# Patient Record
Sex: MALE | Race: WHITE | HISPANIC OR LATINO | Employment: FULL TIME | URBAN - METROPOLITAN AREA
[De-identification: names, ages, dates, MRNs, and addresses within clinical notes are randomized per-mention and may not be internally consistent; named-entity substitution may affect disease eponyms.]

---

## 2018-08-01 ENCOUNTER — OFFICE VISIT (OUTPATIENT)
Dept: FAMILY MEDICINE | Facility: CLINIC | Age: 38
End: 2018-08-01
Payer: COMMERCIAL

## 2018-08-01 ENCOUNTER — LAB VISIT (OUTPATIENT)
Dept: LAB | Facility: HOSPITAL | Age: 38
End: 2018-08-01
Attending: FAMILY MEDICINE
Payer: COMMERCIAL

## 2018-08-01 VITALS
WEIGHT: 213.19 LBS | RESPIRATION RATE: 20 BRPM | SYSTOLIC BLOOD PRESSURE: 112 MMHG | TEMPERATURE: 99 F | HEIGHT: 71 IN | OXYGEN SATURATION: 99 % | HEART RATE: 69 BPM | BODY MASS INDEX: 29.85 KG/M2 | DIASTOLIC BLOOD PRESSURE: 92 MMHG

## 2018-08-01 DIAGNOSIS — Z00.00 ANNUAL PHYSICAL EXAM: Primary | ICD-10-CM

## 2018-08-01 DIAGNOSIS — Z00.00 ANNUAL PHYSICAL EXAM: ICD-10-CM

## 2018-08-01 LAB
ALBUMIN SERPL BCP-MCNC: 4.7 G/DL
ALP SERPL-CCNC: 46 U/L
ALT SERPL W/O P-5'-P-CCNC: 17 U/L
ANION GAP SERPL CALC-SCNC: 10 MMOL/L
AST SERPL-CCNC: 18 U/L
BASOPHILS # BLD AUTO: 0.01 K/UL
BASOPHILS NFR BLD: 0.2 %
BILIRUB SERPL-MCNC: 1.6 MG/DL
BUN SERPL-MCNC: 11 MG/DL
CALCIUM SERPL-MCNC: 10 MG/DL
CHLORIDE SERPL-SCNC: 100 MMOL/L
CHOLEST SERPL-MCNC: 178 MG/DL
CHOLEST/HDLC SERPL: 3.2 {RATIO}
CO2 SERPL-SCNC: 26 MMOL/L
CREAT SERPL-MCNC: 1 MG/DL
DIFFERENTIAL METHOD: ABNORMAL
EOSINOPHIL # BLD AUTO: 0 K/UL
EOSINOPHIL NFR BLD: 0.5 %
ERYTHROCYTE [DISTWIDTH] IN BLOOD BY AUTOMATED COUNT: 13.2 %
EST. GFR  (AFRICAN AMERICAN): >60 ML/MIN/1.73 M^2
EST. GFR  (NON AFRICAN AMERICAN): >60 ML/MIN/1.73 M^2
ESTIMATED AVG GLUCOSE: 94 MG/DL
GLUCOSE SERPL-MCNC: 95 MG/DL
HBA1C MFR BLD HPLC: 4.9 %
HCT VFR BLD AUTO: 43.7 %
HDLC SERPL-MCNC: 56 MG/DL
HDLC SERPL: 31.5 %
HGB BLD-MCNC: 14.7 G/DL
IMM GRANULOCYTES # BLD AUTO: 0.04 K/UL
IMM GRANULOCYTES NFR BLD AUTO: 0.7 %
LDLC SERPL CALC-MCNC: 111.6 MG/DL
LYMPHOCYTES # BLD AUTO: 1.4 K/UL
LYMPHOCYTES NFR BLD: 22.3 %
MCH RBC QN AUTO: 29.3 PG
MCHC RBC AUTO-ENTMCNC: 33.6 G/DL
MCV RBC AUTO: 87 FL
MONOCYTES # BLD AUTO: 0.5 K/UL
MONOCYTES NFR BLD: 8.8 %
NEUTROPHILS # BLD AUTO: 4.2 K/UL
NEUTROPHILS NFR BLD: 67.5 %
NONHDLC SERPL-MCNC: 122 MG/DL
NRBC BLD-RTO: 0 /100 WBC
PLATELET # BLD AUTO: 208 K/UL
PMV BLD AUTO: 10.2 FL
POTASSIUM SERPL-SCNC: 4 MMOL/L
PROT SERPL-MCNC: 7.8 G/DL
RBC # BLD AUTO: 5.01 M/UL
SODIUM SERPL-SCNC: 136 MMOL/L
T4 FREE SERPL-MCNC: 1.13 NG/DL
TRIGL SERPL-MCNC: 52 MG/DL
TSH SERPL DL<=0.005 MIU/L-ACNC: 1.01 UIU/ML
WBC # BLD AUTO: 6.15 K/UL

## 2018-08-01 PROCEDURE — 84443 ASSAY THYROID STIM HORMONE: CPT

## 2018-08-01 PROCEDURE — 99999 PR PBB SHADOW E&M-EST. PATIENT-LVL III: CPT | Mod: PBBFAC,,, | Performed by: FAMILY MEDICINE

## 2018-08-01 PROCEDURE — 83036 HEMOGLOBIN GLYCOSYLATED A1C: CPT

## 2018-08-01 PROCEDURE — 99385 PREV VISIT NEW AGE 18-39: CPT | Mod: S$GLB,,, | Performed by: FAMILY MEDICINE

## 2018-08-01 PROCEDURE — 85025 COMPLETE CBC W/AUTO DIFF WBC: CPT

## 2018-08-01 PROCEDURE — 36415 COLL VENOUS BLD VENIPUNCTURE: CPT | Mod: PO

## 2018-08-01 PROCEDURE — 80053 COMPREHEN METABOLIC PANEL: CPT

## 2018-08-01 PROCEDURE — 84439 ASSAY OF FREE THYROXINE: CPT

## 2018-08-01 PROCEDURE — 80061 LIPID PANEL: CPT

## 2018-08-01 RX ORDER — TETANUS TOXOID, REDUCED DIPHTHERIA TOXOID AND ACELLULAR PERTUSSIS VACCINE, ADSORBED 5; 2.5; 8; 8; 2.5 [IU]/.5ML; [IU]/.5ML; UG/.5ML; UG/.5ML; UG/.5ML
SUSPENSION INTRAMUSCULAR
COMMUNITY
Start: 2018-07-31 | End: 2019-10-10

## 2018-08-01 NOTE — PROGRESS NOTES
Chief Complaint   Patient presents with    [facility]       HPI  Farhan Munoz is a 38 y.o. male with medical diagnoses as listed in the medical history and problem list that presents to [facility].      Increased stressors - owns 2 companies, financial stress, baby due this month, last 2-3 months, increased anxiety overall.     Treatment - has stopped checking emails late night, later start to day,     PAST MEDICAL HISTORY:  No past medical history on file.    PAST SURGICAL HISTORY:  Past Surgical History:   Procedure Laterality Date    KNEE SURGERY      16yrs       SOCIAL HISTORY:  Social History     Social History    Marital status:      Spouse name: N/A    Number of children: N/A    Years of education: N/A     Occupational History    Not on file.     Social History Main Topics    Smoking status: Never Smoker    Smokeless tobacco: Not on file    Alcohol use Yes    Drug use: No    Sexual activity: Yes     Other Topics Concern    Not on file     Social History Narrative    No narrative on file       FAMILY HISTORY:  Family History   Problem Relation Age of Onset    Pancreatic cancer Father        ALLERGIES AND MEDICATIONS: updated and reviewed.  Review of patient's allergies indicates:  No Known Allergies  Current Outpatient Prescriptions   Medication Sig Dispense Refill    BOOSTRIX TDAP 2.5-8-5 Lf-mcg-Lf/0.5mL Syrg injection       finasteride (PROSCAR) 5 mg tablet        No current facility-administered medications for this visit.        ROS  Review of Systems   Constitutional: Negative for activity change and unexpected weight change.   HENT: Negative for hearing loss, rhinorrhea and trouble swallowing.    Eyes: Negative for discharge and visual disturbance.   Respiratory: Negative for chest tightness and wheezing.    Cardiovascular: Negative for chest pain and palpitations.   Gastrointestinal: Negative for blood in stool, constipation, diarrhea and vomiting.   Endocrine:  "Negative for polydipsia and polyuria.   Genitourinary: Negative for difficulty urinating, hematuria and urgency.   Musculoskeletal: Negative for arthralgias, joint swelling and neck pain.   Neurological: Negative for weakness and headaches.   Psychiatric/Behavioral: Positive for dysphoric mood. Negative for confusion.       Physical Exam  Vitals:    08/01/18 0958   BP: (!) 112/92   Pulse: 69   Resp: 20   Temp: 98.6 °F (37 °C)    Body mass index is 29.73 kg/m².  Weight: 96.7 kg (213 lb 3 oz)   Height: 5' 11" (180.3 cm)     Physical Exam   Constitutional: He is oriented to person, place, and time. He appears well-developed and well-nourished.   HENT:   Head: Normocephalic and atraumatic.   Eyes: Conjunctivae and EOM are normal. Pupils are equal, round, and reactive to light.   Neck: Normal range of motion. Neck supple.   Cardiovascular: Normal rate, regular rhythm and normal heart sounds.    Pulmonary/Chest: Effort normal and breath sounds normal.   Abdominal: Soft. Bowel sounds are normal.   Musculoskeletal: Normal range of motion.   Neurological: He is alert and oriented to person, place, and time. He has normal reflexes.   Skin: Skin is warm and dry.   Psychiatric: He has a normal mood and affect. His behavior is normal. Judgment and thought content normal.       Health Maintenance       Date Due Completion Date    Lipid Panel 1981 ---    TETANUS VACCINE 05/05/1999 ---    Influenza Vaccine 08/01/2018 ---          Assessment & Plan    Annual physical exam  -     CBC auto differential; Future; Expected date: 08/01/2018  -     Comprehensive metabolic panel; Future; Expected date: 08/01/2018  -     Lipid panel; Future; Expected date: 08/01/2018  -     TSH; Future; Expected date: 08/01/2018  -     T4, free; Future; Expected date: 08/01/2018  - Counseled on age appropriate medical preventative services, including age appropriate cancer screenings, over all nutritional health, need for a consistent exercise regimen " and an over all push towards maintaining a vigorous and active lifestyle.      - Counseled on age appropriate vaccines and discussed upcoming health care needs based on age/gender.  Spent time with patient counseling on need for a good patient/doctor relationship moving forward.  Discussed use of common OTC medications and supplements.  Discussed common dietary aids and use of caffeine and the need for good sleep hygiene and stress management.        Follow-up in about 3 months (around 11/1/2018), or if symptoms worsen or fail to improve.

## 2019-10-11 ENCOUNTER — OFFICE VISIT (OUTPATIENT)
Dept: FAMILY MEDICINE | Facility: CLINIC | Age: 39
End: 2019-10-11
Payer: COMMERCIAL

## 2019-10-11 VITALS
WEIGHT: 211.63 LBS | SYSTOLIC BLOOD PRESSURE: 130 MMHG | OXYGEN SATURATION: 100 % | HEIGHT: 71 IN | TEMPERATURE: 97 F | DIASTOLIC BLOOD PRESSURE: 80 MMHG | HEART RATE: 63 BPM | BODY MASS INDEX: 29.63 KG/M2

## 2019-10-11 DIAGNOSIS — Z00.00 ANNUAL PHYSICAL EXAM: Primary | ICD-10-CM

## 2019-10-11 DIAGNOSIS — E66.3 OVERWEIGHT (BMI 25.0-29.9): ICD-10-CM

## 2019-10-11 PROCEDURE — 99395 PREV VISIT EST AGE 18-39: CPT | Mod: S$GLB,,, | Performed by: FAMILY MEDICINE

## 2019-10-11 PROCEDURE — 99999 PR PBB SHADOW E&M-EST. PATIENT-LVL III: CPT | Mod: PBBFAC,,, | Performed by: FAMILY MEDICINE

## 2019-10-11 PROCEDURE — 99395 PR PREVENTIVE VISIT,EST,18-39: ICD-10-PCS | Mod: S$GLB,,, | Performed by: FAMILY MEDICINE

## 2019-10-11 PROCEDURE — 99999 PR PBB SHADOW E&M-EST. PATIENT-LVL III: ICD-10-PCS | Mod: PBBFAC,,, | Performed by: FAMILY MEDICINE

## 2019-10-11 NOTE — PROGRESS NOTES
"Chief Complaint   Patient presents with    [facility]     SUBJECTIVE:   Farhan Munoz is a 39 y.o. male presenting for his annual checkup.  No current outpatient medications on file.     No current facility-administered medications for this visit.      Allergies: Patient has no known allergies.     ROS:  Feeling well. No dyspnea or chest pain on exertion. No abdominal pain, change in bowel habits, black or bloody stools. No urinary tract or prostatic symptoms. No neurological complaints.  Answers for HPI/ROS submitted by the patient on 10/10/2019   activity change: No  unexpected weight change: No  neck pain: No  hearing loss: No  rhinorrhea: No  trouble swallowing: No  eye discharge: No  visual disturbance: No  chest tightness: No  wheezing: No  chest pain: No  palpitations: No  blood in stool: No  constipation: No  vomiting: No  diarrhea: No  polydipsia: No  polyuria: No  difficulty urinating: No  urgency: No  hematuria: No  joint swelling: No  arthralgias: No  headaches: No  weakness: No  confusion: No  dysphoric mood: No    OBJECTIVE:   The patient appears well, alert, oriented x 3, in no distress.   /80   Pulse 63   Temp 97.4 °F (36.3 °C) (Oral)   Ht 5' 11" (1.803 m)   Wt 96 kg (211 lb 10.3 oz)   SpO2 100%   BMI 29.52 kg/m²        ENT normal.  Neck supple. No adenopathy or thyromegaly. ESTELLA. Lungs are clear, good air entry, no wheezes, rhonchi or rales. S1 and S2 normal, no murmurs, regular rate and rhythm. Abdomen is soft without tenderness, guarding, mass or organomegaly.  exam: deferred.  Extremities show no edema, normal peripheral pulses. Neurological is normal without focal findings.  Some signs of chronic dehydration.    ASSESSMENT:   1. Annual physical exam    2. Overweight (BMI 25.0-29.9)        PLAN:   Farhan was seen today for [facility].    Diagnoses and all orders for this visit:    Annual physical exam    Overweight (BMI 25.0-29.9)    Other orders  -     Cancel: Tdap " Vaccine      Counseled on age appropriate medical preventative services, including age appropriate cancer screenings, over all nutritional health, need for a consistent exercise regimen and an over all push towards maintaining a vigorous and active lifestyle.  Counseled on age appropriate vaccines and discussed upcoming health care needs based on age/gender.  Spent time with patient counseling on need for a good patient/doctor relationship moving forward.  Discussed use of common OTC medications and supplements.  Discussed common dietary aids and use of caffeine and the need for good sleep hygiene and stress management.

## 2019-11-25 ENCOUNTER — LAB VISIT (OUTPATIENT)
Dept: LAB | Facility: HOSPITAL | Age: 39
End: 2019-11-25
Attending: FAMILY MEDICINE
Payer: COMMERCIAL

## 2019-11-25 DIAGNOSIS — Z00.00 ANNUAL PHYSICAL EXAM: ICD-10-CM

## 2019-11-25 LAB
ALBUMIN SERPL BCP-MCNC: 4.6 G/DL (ref 3.5–5.2)
ALP SERPL-CCNC: 49 U/L (ref 55–135)
ALT SERPL W/O P-5'-P-CCNC: 15 U/L (ref 10–44)
ANION GAP SERPL CALC-SCNC: 7 MMOL/L (ref 8–16)
AST SERPL-CCNC: 16 U/L (ref 10–40)
BASOPHILS # BLD AUTO: 0.01 K/UL (ref 0–0.2)
BASOPHILS NFR BLD: 0.2 % (ref 0–1.9)
BILIRUB SERPL-MCNC: 1.2 MG/DL (ref 0.1–1)
BUN SERPL-MCNC: 15 MG/DL (ref 6–20)
CALCIUM SERPL-MCNC: 10.2 MG/DL (ref 8.7–10.5)
CHLORIDE SERPL-SCNC: 103 MMOL/L (ref 95–110)
CHOLEST SERPL-MCNC: 181 MG/DL (ref 120–199)
CHOLEST/HDLC SERPL: 3 {RATIO} (ref 2–5)
CO2 SERPL-SCNC: 30 MMOL/L (ref 23–29)
CREAT SERPL-MCNC: 1.1 MG/DL (ref 0.5–1.4)
DIFFERENTIAL METHOD: ABNORMAL
EOSINOPHIL # BLD AUTO: 0 K/UL (ref 0–0.5)
EOSINOPHIL NFR BLD: 0.9 % (ref 0–8)
ERYTHROCYTE [DISTWIDTH] IN BLOOD BY AUTOMATED COUNT: 13.6 % (ref 11.5–14.5)
EST. GFR  (AFRICAN AMERICAN): >60 ML/MIN/1.73 M^2
EST. GFR  (NON AFRICAN AMERICAN): >60 ML/MIN/1.73 M^2
ESTIMATED AVG GLUCOSE: 100 MG/DL (ref 68–131)
GLUCOSE SERPL-MCNC: 109 MG/DL (ref 70–110)
HBA1C MFR BLD HPLC: 5.1 % (ref 4–5.6)
HCT VFR BLD AUTO: 45 % (ref 40–54)
HDLC SERPL-MCNC: 60 MG/DL (ref 40–75)
HDLC SERPL: 33.1 % (ref 20–50)
HGB BLD-MCNC: 14.9 G/DL (ref 14–18)
IMM GRANULOCYTES # BLD AUTO: 0.03 K/UL (ref 0–0.04)
IMM GRANULOCYTES NFR BLD AUTO: 0.6 % (ref 0–0.5)
LDLC SERPL CALC-MCNC: 105.8 MG/DL (ref 63–159)
LYMPHOCYTES # BLD AUTO: 1.5 K/UL (ref 1–4.8)
LYMPHOCYTES NFR BLD: 31.8 % (ref 18–48)
MCH RBC QN AUTO: 28.3 PG (ref 27–31)
MCHC RBC AUTO-ENTMCNC: 33.1 G/DL (ref 32–36)
MCV RBC AUTO: 86 FL (ref 82–98)
MONOCYTES # BLD AUTO: 0.4 K/UL (ref 0.3–1)
MONOCYTES NFR BLD: 7.6 % (ref 4–15)
NEUTROPHILS # BLD AUTO: 2.7 K/UL (ref 1.8–7.7)
NEUTROPHILS NFR BLD: 58.9 % (ref 38–73)
NONHDLC SERPL-MCNC: 121 MG/DL
NRBC BLD-RTO: 0 /100 WBC
PLATELET # BLD AUTO: 171 K/UL (ref 150–350)
PMV BLD AUTO: 10 FL (ref 9.2–12.9)
POTASSIUM SERPL-SCNC: 4.1 MMOL/L (ref 3.5–5.1)
PROT SERPL-MCNC: 7.8 G/DL (ref 6–8.4)
RBC # BLD AUTO: 5.26 M/UL (ref 4.6–6.2)
SODIUM SERPL-SCNC: 140 MMOL/L (ref 136–145)
TRIGL SERPL-MCNC: 76 MG/DL (ref 30–150)
TSH SERPL DL<=0.005 MIU/L-ACNC: 1.59 UIU/ML (ref 0.4–4)
URATE SERPL-MCNC: 5.8 MG/DL (ref 3.4–7)
WBC # BLD AUTO: 4.62 K/UL (ref 3.9–12.7)

## 2019-11-25 PROCEDURE — 83036 HEMOGLOBIN GLYCOSYLATED A1C: CPT

## 2019-11-25 PROCEDURE — 85025 COMPLETE CBC W/AUTO DIFF WBC: CPT

## 2019-11-25 PROCEDURE — 84550 ASSAY OF BLOOD/URIC ACID: CPT

## 2019-11-25 PROCEDURE — 80061 LIPID PANEL: CPT

## 2019-11-25 PROCEDURE — 84443 ASSAY THYROID STIM HORMONE: CPT

## 2019-11-25 PROCEDURE — 80053 COMPREHEN METABOLIC PANEL: CPT

## 2019-11-25 PROCEDURE — 82040 ASSAY OF SERUM ALBUMIN: CPT

## 2019-11-30 LAB
ALBUMIN SERPL-MCNC: 4.8 G/DL (ref 3.6–5.1)
SHBG SERPL-SCNC: 52 NMOL/L (ref 10–50)
TESTOST FREE SERPL-MCNC: 62.9 PG/ML (ref 46–224)
TESTOST SERPL-MCNC: 675 NG/DL (ref 250–1100)
TESTOSTERONE.FREE+WB SERPL-MCNC: 137.6 NG/DL (ref 110–575)

## 2019-12-12 ENCOUNTER — OFFICE VISIT (OUTPATIENT)
Dept: FAMILY MEDICINE | Facility: CLINIC | Age: 39
End: 2019-12-12
Payer: COMMERCIAL

## 2019-12-12 VITALS
TEMPERATURE: 98 F | OXYGEN SATURATION: 99 % | HEIGHT: 71 IN | DIASTOLIC BLOOD PRESSURE: 74 MMHG | BODY MASS INDEX: 30.77 KG/M2 | SYSTOLIC BLOOD PRESSURE: 120 MMHG | WEIGHT: 219.81 LBS | HEART RATE: 74 BPM

## 2019-12-12 DIAGNOSIS — Z00.00 ANNUAL PHYSICAL EXAM: ICD-10-CM

## 2019-12-12 DIAGNOSIS — R73.03 PREDIABETES: Primary | ICD-10-CM

## 2019-12-12 PROCEDURE — 99999 PR PBB SHADOW E&M-EST. PATIENT-LVL III: ICD-10-PCS | Mod: PBBFAC,,, | Performed by: FAMILY MEDICINE

## 2019-12-12 PROCEDURE — 99999 PR PBB SHADOW E&M-EST. PATIENT-LVL III: CPT | Mod: PBBFAC,,, | Performed by: FAMILY MEDICINE

## 2019-12-12 PROCEDURE — 99215 PR OFFICE/OUTPT VISIT, EST, LEVL V, 40-54 MIN: ICD-10-PCS | Mod: S$GLB,,, | Performed by: FAMILY MEDICINE

## 2019-12-12 PROCEDURE — 99215 OFFICE O/P EST HI 40 MIN: CPT | Mod: S$GLB,,, | Performed by: FAMILY MEDICINE

## 2019-12-12 NOTE — PROGRESS NOTES
"Chief Complaint   Patient presents with    Results       SUBJECTIVE:  Farhan Munoz is a 39 y.o. male here for new problem of results and review it.  Currently has co-morbidities including per problem list.      History reviewed. No pertinent past medical history.  Past Surgical History:   Procedure Laterality Date    KNEE SURGERY      16yrs     Social History     Socioeconomic History    Marital status:      Spouse name: Not on file    Number of children: Not on file    Years of education: Not on file    Highest education level: Not on file   Occupational History    Not on file   Social Needs    Financial resource strain: Not hard at all    Food insecurity:     Worry: Never true     Inability: Never true    Transportation needs:     Medical: No     Non-medical: No   Tobacco Use    Smoking status: Never Smoker   Substance and Sexual Activity    Alcohol use: Yes     Frequency: 2-3 times a week     Drinks per session: 1 or 2     Binge frequency: Never    Drug use: No    Sexual activity: Yes   Lifestyle    Physical activity:     Days per week: 0 days     Minutes per session: 0 min    Stress: To some extent   Relationships    Social connections:     Talks on phone: More than three times a week     Gets together: Once a week     Attends [employer] service: Not on file     Active member of club or organization: No     Attends meetings of clubs or organizations: Never     Relationship status:    Other Topics Concern    Not on file   Social History Narrative    Not on file     Family History   Problem Relation Age of Onset    Pancreatic cancer Father      No current outpatient medications on file prior to visit.     No current facility-administered medications on file prior to visit.      Review of patient's allergies indicates:  No Known Allergies      ROS    OBJECTIVE:  /74   Pulse 74   Temp 97.9 °F (36.6 °C) (Oral)   Ht 5' 11" (1.803 m)   Wt 99.7 kg (219 lb 12.8 oz)   SpO2 99%   " BMI 30.66 kg/m²     Wt Readings from Last 3 Encounters:   12/12/19 99.7 kg (219 lb 12.8 oz)   10/11/19 96 kg (211 lb 10.3 oz)   08/01/18 96.7 kg (213 lb 3 oz)     BP Readings from Last 3 Encounters:   12/12/19 120/74   10/11/19 130/80   08/01/18 (!) 112/92   Answers for HPI/ROS submitted by the patient on 12/11/2019   activity change: No  unexpected weight change: No  rhinorrhea: No  trouble swallowing: No  visual disturbance: No  chest tightness: No  polyuria: No  difficulty urinating: No  joint swelling: No  arthralgias: No  confusion: No  dysphoric mood: No      He appears well, in no apparent distress.  Alert and oriented times three, pleasant and cooperative. Vital signs are as documented in vital signs section.  Time spent in counseling    Review of old Records:  Reviewed per epic     Review of old labs:  Lab Visit on 11/25/2019   Component Date Value Ref Range Status    Specimen UA 11/25/2019 Urine, Unspecified   Final    Color, UA 11/25/2019 Straw  Yellow, Straw, Radha Final    Appearance, UA 11/25/2019 Clear  Clear Final    pH, UA 11/25/2019 7.0  5.0 - 8.0 Final    Specific [place], UA 11/25/2019 1.005  1.005 - 1.030 Final    Protein, UA 11/25/2019 Negative  Negative Final    Comment: Recommend a 24 hour urine protein or a urine   protein/creatinine ratio if globulin induced proteinuria is  clinically suspected.      Glucose, UA 11/25/2019 Negative  Negative Final    Ketones, UA 11/25/2019 Negative  Negative Final    Bilirubin (UA) 11/25/2019 Negative  Negative Final    Occult Blood UA 11/25/2019 Negative  Negative Final    Nitrite, UA 11/25/2019 Negative  Negative Final    Urobilinogen, UA 11/25/2019 Negative  <2.0 EU/dL Final    Leukocytes, UA 11/25/2019 Negative  Negative Final   Lab Visit on 11/25/2019   Component Date Value Ref Range Status    WBC 11/25/2019 4.62  3.90 - 12.70 K/uL Final    RBC 11/25/2019 5.26  4.60 - 6.20 M/uL Final    Hemoglobin 11/25/2019 14.9  14.0 - 18.0 g/dL Final     Hematocrit 11/25/2019 45.0  40.0 - 54.0 % Final    Mean Corpuscular Volume 11/25/2019 86  82 - 98 fL Final    Mean Corpuscular Hemoglobin 11/25/2019 28.3  27.0 - 31.0 pg Final    Mean Corpuscular Hemoglobin Conc 11/25/2019 33.1  32.0 - 36.0 g/dL Final    RDW 11/25/2019 13.6  11.5 - 14.5 % Final    Platelets 11/25/2019 171  150 - 350 K/uL Final    MPV 11/25/2019 10.0  9.2 - 12.9 fL Final    Immature Granulocytes 11/25/2019 0.6* 0.0 - 0.5 % Final    Gran # (ANC) 11/25/2019 2.7  1.8 - 7.7 K/uL Final    Immature Grans (Abs) 11/25/2019 0.03  0.00 - 0.04 K/uL Final    Comment: Mild elevation in immature granulocytes is non specific and   can be seen in a variety of conditions including stress response,   acute inflammation, trauma and pregnancy. Correlation with other   laboratory and clinical findings is essential.      Lymph # 11/25/2019 1.5  1.0 - 4.8 K/uL Final    Mono # 11/25/2019 0.4  0.3 - 1.0 K/uL Final    Eos # 11/25/2019 0.0  0.0 - 0.5 K/uL Final    Baso # 11/25/2019 0.01  0.00 - 0.20 K/uL Final    nRBC 11/25/2019 0  0 /100 WBC Final    Gran% 11/25/2019 58.9  38.0 - 73.0 % Final    Lymph% 11/25/2019 31.8  18.0 - 48.0 % Final    Mono% 11/25/2019 7.6  4.0 - 15.0 % Final    Eosinophil% 11/25/2019 0.9  0.0 - 8.0 % Final    Basophil% 11/25/2019 0.2  0.0 - 1.9 % Final    Differential Method 11/25/2019 Automated   Final    Sodium 11/25/2019 140  136 - 145 mmol/L Final    Potassium 11/25/2019 4.1  3.5 - 5.1 mmol/L Final    Chloride 11/25/2019 103  95 - 110 mmol/L Final    CO2 11/25/2019 30* 23 - 29 mmol/L Final    Glucose 11/25/2019 109  70 - 110 mg/dL Final    BUN, Bld 11/25/2019 15  6 - 20 mg/dL Final    Creatinine 11/25/2019 1.1  0.5 - 1.4 mg/dL Final    Calcium 11/25/2019 10.2  8.7 - 10.5 mg/dL Final    Total Protein 11/25/2019 7.8  6.0 - 8.4 g/dL Final    Albumin 11/25/2019 4.6  3.5 - 5.2 g/dL Final    Total Bilirubin 11/25/2019 1.2* 0.1 - 1.0 mg/dL Final    Comment: For infants  and newborns, interpretation of results should be based  on gestational age, weight and in agreement with clinical  observations.  Premature Infant recommended reference ranges:  Up to 24 hours.............<8.0 mg/dL  Up to 48 hours............<12.0 mg/dL  3-5 days..................<15.0 mg/dL  6-29 days.................<15.0 mg/dL      Alkaline Phosphatase 11/25/2019 49* 55 - 135 U/L Final    AST 11/25/2019 16  10 - 40 U/L Final    ALT 11/25/2019 15  10 - 44 U/L Final    Anion Gap 11/25/2019 7* 8 - 16 mmol/L Final    eGFR if African American 11/25/2019 >60  >60 mL/min/1.73 m^2 Final    eGFR if non African American 11/25/2019 >60  >60 mL/min/1.73 m^2 Final    Comment: Calculation used to obtain the estimated glomerular filtration  rate (eGFR) is the CKD-EPI equation.       Hemoglobin A1C 11/25/2019 5.1  4.0 - 5.6 % Final    Comment: ADA Screening Guidelines:  5.7-6.4%  Consistent with prediabetes  >or=6.5%  Consistent with diabetes  High levels of fetal hemoglobin interfere with the HbA1C  assay. Heterozygous hemoglobin variants (HbS, HgC, etc)do  not significantly interfere with this assay.   However, presence of multiple variants may affect accuracy.      Estimated Avg Glucose 11/25/2019 100  68 - 131 mg/dL Final    Cholesterol 11/25/2019 181  120 - 199 mg/dL Final    Comment: The National Cholesterol Education Program (NCEP) has set the  following guidelines (reference ranges) for Cholesterol:  Optimal.....................<200 mg/dL  Borderline High.............200-239 mg/dL  High........................> or = 240 mg/dL      Triglycerides 11/25/2019 76  30 - 150 mg/dL Final    Comment: The National Cholesterol Education Program (NCEP) has set the  following guidelines (reference values) for triglycerides:  Normal......................<150 mg/dL  Borderline High.............150-199 mg/dL  High........................200-499 mg/dL      HDL 11/25/2019 60  40 - 75 mg/dL Final    Comment: The National  Cholesterol Education Program (NCEP) has set the  following guidelines (reference values) for HDL Cholesterol:  Low...............<40 mg/dL  Optimal...........>60 mg/dL      LDL Cholesterol 11/25/2019 105.8  63.0 - 159.0 mg/dL Final    Comment: The National Cholesterol Education Program (NCEP) has set the  following guidelines (reference values) for LDL Cholesterol:  Optimal.......................<130 mg/dL  Borderline High...............130-159 mg/dL  High..........................160-189 mg/dL  Very High.....................>190 mg/dL      Hdl/Cholesterol Ratio 11/25/2019 33.1  20.0 - 50.0 % Final    Total Cholesterol/HDL Ratio 11/25/2019 3.0  2.0 - 5.0 Final    Non-HDL Cholesterol 11/25/2019 121  mg/dL Final    Comment: Risk category and Non-HDL cholesterol goals:  Coronary heart disease (CHD)or equivalent (10-year risk of CHD >20%):  Non-HDL cholesterol goal     <130 mg/dL  Two or more CHD risk factors and 10-year risk of CHD <= 20%:  Non-HDL cholesterol goal     <160 mg/dL  0 to 1 CHD risk factor:  Non-HDL cholesterol goal     <190 mg/dL      Testosterone 11/25/2019 675  250 - 1100 ng/dL Final    Testosterone, Free 11/25/2019 62.9  46.0 - 224.0 pg/mL Final    Testosterone, Bioavailable 11/25/2019 137.6  110.0 - 575.0 ng/dL Final    Sex Hormone Binding Globulin 11/25/2019 52* 10 - 50 nmol/L Final    Albumin 11/25/2019 4.8  3.6 - 5.1 g/dL Final    Comment: **Data from J Clin Invest 1974:53:819-828 and J Clin Endocrinol   Metab 1973 36:4570-1497. Men with clinically significant   hypogonadal symptoms and testosterone values repeatedly in the   range of the 200-300 ng/dL or less, may benefit from testosterone  treatment after adequate risk and benefits counseling.  For additional information, please refer to   http://education.[employer]/faq/KYK694 (This link is   being provided for informational/ educational purposes only.)  This test was developed and its analytical performance    characteristics have been determined by [facility]. It has not been cleared or approved by the [place]  Food and Drug Administration. This assay has been validated   pursuant to the CLIA regulations and is used for clinical   purposes.  @ Test Performed By:  [facility]  Greg Schultz M.D., Ph.D.,   [address] [place] [address]  CLIA  [id number]      TSH 11/25/2019 1.593  0.400 - 4.000 uIU/mL Final    Uric Acid 11/25/2019 5.8  3.4 - 7.0 mg/dL Final         Review of old imaging:      ASSESSMENT:  Problem List Items Addressed This Visit     Prediabetes - Primary          ICD-10-CM ICD-9-CM   1. Prediabetes R73.03 790.29         PLAN:  1. Prediabetes  Explained to patient that this is a complex metabolic process, related to genetics in the form of family history of this disease.  The other key factors are exposure to a poor diet high in simple carbs/sugars that is very prevalent in [place] and also a lack of exercise with poor weight control that leads to the changes that eventually becomes diabetes.  Explained to patient that in certain cases we can try to reverse these processes and prolong time to diabetes, or completely reverse the process.  Explained that the best chance of stopping the progression is a Diabetes Prevention Program, with or without Metformin.  Explained that we will refer the patient to local [facility] that offers this program and encouraged patient to consider it.  If patient decides not to do this, they are counseled to let us know so we can initiate medical treatment.        Spent >40 (42) minutes with the patient with 1/2 time in face to face counseling about the above.      No follow-ups on file.

## 2019-12-16 ENCOUNTER — PATIENT OUTREACH (OUTPATIENT)
Dept: ADMINISTRATIVE | Facility: OTHER | Age: 39
End: 2019-12-16

## 2019-12-17 ENCOUNTER — OFFICE VISIT (OUTPATIENT)
Dept: OPTOMETRY | Facility: CLINIC | Age: 39
End: 2019-12-17
Payer: COMMERCIAL

## 2019-12-17 DIAGNOSIS — Z13.5 SCREENING FOR EYE CONDITION: ICD-10-CM

## 2019-12-17 DIAGNOSIS — H52.13 MYOPIA OF BOTH EYES: ICD-10-CM

## 2019-12-17 DIAGNOSIS — H57.11 PAIN OF RIGHT EYE: Primary | ICD-10-CM

## 2019-12-17 PROCEDURE — 92015 DETERMINE REFRACTIVE STATE: CPT | Mod: S$GLB,,, | Performed by: OPTOMETRIST

## 2019-12-17 PROCEDURE — 92004 COMPRE OPH EXAM NEW PT 1/>: CPT | Mod: S$GLB,,, | Performed by: OPTOMETRIST

## 2019-12-17 PROCEDURE — 92015 PR REFRACTION: ICD-10-PCS | Mod: S$GLB,,, | Performed by: OPTOMETRIST

## 2019-12-17 PROCEDURE — 92004 PR EYE EXAM, NEW PATIENT,COMPREHESV: ICD-10-PCS | Mod: S$GLB,,, | Performed by: OPTOMETRIST

## 2019-12-17 PROCEDURE — 99999 PR PBB SHADOW E&M-EST. PATIENT-LVL III: ICD-10-PCS | Mod: PBBFAC,,, | Performed by: OPTOMETRIST

## 2019-12-17 PROCEDURE — 99999 PR PBB SHADOW E&M-EST. PATIENT-LVL III: CPT | Mod: PBBFAC,,, | Performed by: OPTOMETRIST

## 2019-12-17 NOTE — PATIENT INSTRUCTIONS
Ocular health appears good in both eyes.  Mr. Munoz reports symptom of ocular discomfort in the right eye, but no apparent ocular cause for symptoms found on examination today.  No evidence of ocular surface compromise.  No evidence of ocular inflammation, or infection.    Intraocular pressure slightly above normal range in each eye, as measured with Tonopen, but doubt accuracy of readings, as Mr. Munoz very apprehensive of testing.   However, even if IOP readings accurate, IOP is not high enough to cause eye pain, and there is no evidence of glaucomatous changes in the appearance of the optic nerve head in either eye.    Myopia in each eye, with excellent best-corrected VA in each eye.  New spectacle lens Rx issued for use as needed for distance viewing.    Suggest nasal decongestant (either oral or nasal spray) for a few days, and monitor symptoms of ocular discomfort.    Call/return after several days if no improvement in symptoms, and especially if symptoms get worse    Otherwise, recheck in one year.

## 2019-12-17 NOTE — PROGRESS NOTES
HPI     eye examination       Additional comments: General eye exam and refraction.  Recent symptoms of discomfort in the right eye - feels like a bruise when   he rubs the eye.  However feels somewhat better in the last day or so.  Wears glasses for night driving.               Comments     Patient's age: 39 y.o. WM  Occupation: self employed   Approximate date of last eye examination:  20 years ago   Name of last eye doctor seen:    City/State: [place]   Wears glasses? Yes      If yes, wears  Full-time or part-time?  Part-time  Present glasses are: Bifocal, SV Distance, SV Reading?  Distance   Approximate age of present glasses:  4 years old    Got new glasses following last exam, or subsequently?:  No    Any problem with VA with glasses?  no  Wears CLs?:  no  Headaches?  no  Eye pain/discomfort?  Past two days feels discomfort in his right eye                                         Pt c/o it feel like someone punched   him in the right eye                                           every time he blink his eyes                                                                                    Flashes?  no  Floaters?  no  Diplopia/Double vision?  no  Patient's Ocular History:         Any eye surgeries? no         Any eye injury?  no         Any treatment for eye disease?  no  Family history of eye disease?  no  Significant patient medical history:         1. Diabetes?  no       If yes, IDDM or NIDDM?  N/a    2. HBP?  no              3. Other (describe):  none   ! OTC eyedrops currently using:  no   ! Prescription eye meds currently using:  no   ! Any history of allergy/adverse reaction to any eye meds used   previously?  no    ! Any history of allergy/adverse reaction to eyedrops used during prior   eye exam(s)? no    ! Any history of allergy/adverse reaction to Novacaine or similar meds?   no   ! Any history of allergy/adverse reaction to Epinephrine or similar meds?   no    ! Patient okay with use of  anesthetic eyedrops to check eye pressure?    yes        ! Patient okay with use of eyedrops to dilate pupils today?  yes   !  Allergies/Medications/Medical History/Family History reviewed today?    Yes       PD =   60/56  Auto-refraction:  OD   -2.00 DS                              OS  -2.00 +0.50 x 180                                                                   Last edited by Mark Jessica, OD on 12/17/2019  9:30 AM. (History)            Assessment /Plan     For exam results, see Encounter Report.    1. Pain of right eye     2. Screening for eye condition     3. Myopia of both eyes                  Ocular health appears good in both eyes.  Mr. Munoz reports symptom of ocular discomfort in the right eye, but no apparent ocular cause for symptoms found on examination today.  No evidence of ocular surface compromise.  No evidence of ocular inflammation, or infection.    Intraocular pressure slightly above normal range in each eye, as measured with Tonopen, but doubt accuracy of readings, as Mr. Munoz very apprehensive of testing.   However, even if IOP readings accurate, IOP is not high enough to cause eye pain, and there is no evidence of glaucomatous changes in the appearance of the optic nerve head in either eye.    Myopia in each eye, with excellent best-corrected VA in each eye.  New spectacle lens Rx issued for use as needed for distance viewing.    Suggest nasal decongestant (either oral or nasal spray) for a few days, and monitor symptoms of ocular discomfort.    Call/return after several days if no improvement in symptoms, and especially if symptoms get worse    Otherwise, recheck in one year.

## 2020-06-30 ENCOUNTER — PATIENT OUTREACH (OUTPATIENT)
Dept: ADMINISTRATIVE | Facility: HOSPITAL | Age: 40
End: 2020-06-30

## 2020-08-14 DIAGNOSIS — Z11.59 NEED FOR HEPATITIS C SCREENING TEST: ICD-10-CM

## 2021-01-05 ENCOUNTER — PATIENT MESSAGE (OUTPATIENT)
Dept: ADMINISTRATIVE | Facility: HOSPITAL | Age: 41
End: 2021-01-05

## 2021-04-06 ENCOUNTER — PATIENT MESSAGE (OUTPATIENT)
Dept: ADMINISTRATIVE | Facility: HOSPITAL | Age: 41
End: 2021-04-06

## 2021-04-15 ENCOUNTER — PATIENT MESSAGE (OUTPATIENT)
Dept: RESEARCH | Facility: HOSPITAL | Age: 41
End: 2021-04-15

## 2021-05-31 ENCOUNTER — OFFICE VISIT (OUTPATIENT)
Dept: FAMILY MEDICINE | Facility: CLINIC | Age: 41
End: 2021-05-31
Payer: COMMERCIAL

## 2021-05-31 ENCOUNTER — LAB VISIT (OUTPATIENT)
Dept: LAB | Facility: HOSPITAL | Age: 41
End: 2021-05-31
Attending: FAMILY MEDICINE
Payer: COMMERCIAL

## 2021-05-31 VITALS
OXYGEN SATURATION: 97 % | TEMPERATURE: 98 F | DIASTOLIC BLOOD PRESSURE: 82 MMHG | SYSTOLIC BLOOD PRESSURE: 139 MMHG | BODY MASS INDEX: 27.8 KG/M2 | WEIGHT: 199.31 LBS | HEART RATE: 83 BPM

## 2021-05-31 DIAGNOSIS — E66.3 OVERWEIGHT (BMI 25.0-29.9): ICD-10-CM

## 2021-05-31 DIAGNOSIS — Z00.00 ANNUAL PHYSICAL EXAM: ICD-10-CM

## 2021-05-31 DIAGNOSIS — Z00.00 ANNUAL PHYSICAL EXAM: Primary | ICD-10-CM

## 2021-05-31 DIAGNOSIS — R19.7 DIARRHEA, UNSPECIFIED TYPE: ICD-10-CM

## 2021-05-31 DIAGNOSIS — R73.03 PREDIABETES: ICD-10-CM

## 2021-05-31 LAB
BACTERIA #/AREA URNS HPF: ABNORMAL /HPF
BILIRUB UR QL STRIP: NEGATIVE
CLARITY UR: CLEAR
COLOR UR: YELLOW
GLUCOSE UR QL STRIP: NEGATIVE
GRAN CASTS #/AREA URNS LPF: 1 /LPF
HGB UR QL STRIP: NEGATIVE
HYALINE CASTS #/AREA URNS LPF: 40 /LPF
KETONES UR QL STRIP: NEGATIVE
LEUKOCYTE ESTERASE UR QL STRIP: NEGATIVE
MICROSCOPIC COMMENT: ABNORMAL
NITRITE UR QL STRIP: NEGATIVE
PH UR STRIP: 6 [PH] (ref 5–8)
PROT UR QL STRIP: ABNORMAL
RBC #/AREA URNS HPF: 0 /HPF (ref 0–4)
SP GR UR STRIP: 1.03 (ref 1–1.03)
URN SPEC COLLECT METH UR: ABNORMAL
UROBILINOGEN UR STRIP-ACNC: NEGATIVE EU/DL
WBC #/AREA URNS HPF: 5 /HPF (ref 0–5)
WBC CASTS #/AREA URNS LPF: 4 /LPF
WBC CLUMPS URNS QL MICRO: ABNORMAL

## 2021-05-31 PROCEDURE — 99999 PR PBB SHADOW E&M-EST. PATIENT-LVL III: CPT | Mod: PBBFAC,,, | Performed by: FAMILY MEDICINE

## 2021-05-31 PROCEDURE — 1126F AMNT PAIN NOTED NONE PRSNT: CPT | Mod: S$GLB,,, | Performed by: FAMILY MEDICINE

## 2021-05-31 PROCEDURE — 3008F PR BODY MASS INDEX (BMI) DOCUMENTED: ICD-10-PCS | Mod: CPTII,S$GLB,, | Performed by: FAMILY MEDICINE

## 2021-05-31 PROCEDURE — 99396 PR PREVENTIVE VISIT,EST,40-64: ICD-10-PCS | Mod: S$GLB,,, | Performed by: FAMILY MEDICINE

## 2021-05-31 PROCEDURE — 99396 PREV VISIT EST AGE 40-64: CPT | Mod: S$GLB,,, | Performed by: FAMILY MEDICINE

## 2021-05-31 PROCEDURE — 3008F BODY MASS INDEX DOCD: CPT | Mod: CPTII,S$GLB,, | Performed by: FAMILY MEDICINE

## 2021-05-31 PROCEDURE — 99999 PR PBB SHADOW E&M-EST. PATIENT-LVL III: ICD-10-PCS | Mod: PBBFAC,,, | Performed by: FAMILY MEDICINE

## 2021-05-31 PROCEDURE — 1126F PR PAIN SEVERITY QUANTIFIED, NO PAIN PRESENT: ICD-10-PCS | Mod: S$GLB,,, | Performed by: FAMILY MEDICINE

## 2021-05-31 PROCEDURE — 81000 URINALYSIS NONAUTO W/SCOPE: CPT | Performed by: FAMILY MEDICINE

## 2021-05-31 RX ORDER — CIPROFLOXACIN 500 MG/1
500 TABLET ORAL EVERY 12 HOURS
Qty: 14 TABLET | Refills: 0 | Status: SHIPPED | OUTPATIENT
Start: 2021-05-31 | End: 2021-06-07

## 2021-05-31 RX ORDER — AMOXICILLIN 500 MG
CAPSULE ORAL DAILY
COMMUNITY

## 2021-05-31 RX ORDER — VITAMIN A 2400 MCG
CAPSULE ORAL
COMMUNITY

## 2021-05-31 RX ORDER — VITAMIN B COMPLEX
1 CAPSULE ORAL DAILY
COMMUNITY

## 2022-03-08 ENCOUNTER — OFFICE VISIT (OUTPATIENT)
Dept: FAMILY MEDICINE | Facility: CLINIC | Age: 42
End: 2022-03-08
Payer: COMMERCIAL

## 2022-03-08 VITALS
HEIGHT: 71 IN | OXYGEN SATURATION: 98 % | SYSTOLIC BLOOD PRESSURE: 110 MMHG | WEIGHT: 226.19 LBS | DIASTOLIC BLOOD PRESSURE: 80 MMHG | HEART RATE: 75 BPM | TEMPERATURE: 98 F | BODY MASS INDEX: 31.67 KG/M2

## 2022-03-08 DIAGNOSIS — R73.03 PREDIABETES: ICD-10-CM

## 2022-03-08 DIAGNOSIS — E66.3 OVERWEIGHT (BMI 25.0-29.9): ICD-10-CM

## 2022-03-08 DIAGNOSIS — Z00.00 ANNUAL PHYSICAL EXAM: Primary | ICD-10-CM

## 2022-03-08 DIAGNOSIS — R79.89 LOW TESTOSTERONE IN MALE: ICD-10-CM

## 2022-03-08 PROCEDURE — 1159F PR MEDICATION LIST DOCUMENTED IN MEDICAL RECORD: ICD-10-PCS | Mod: CPTII,S$GLB,, | Performed by: FAMILY MEDICINE

## 2022-03-08 PROCEDURE — 99396 PREV VISIT EST AGE 40-64: CPT | Mod: S$GLB,,, | Performed by: FAMILY MEDICINE

## 2022-03-08 PROCEDURE — 3079F DIAST BP 80-89 MM HG: CPT | Mod: CPTII,S$GLB,, | Performed by: FAMILY MEDICINE

## 2022-03-08 PROCEDURE — 3074F PR MOST RECENT SYSTOLIC BLOOD PRESSURE < 130 MM HG: ICD-10-PCS | Mod: CPTII,S$GLB,, | Performed by: FAMILY MEDICINE

## 2022-03-08 PROCEDURE — 3008F PR BODY MASS INDEX (BMI) DOCUMENTED: ICD-10-PCS | Mod: CPTII,S$GLB,, | Performed by: FAMILY MEDICINE

## 2022-03-08 PROCEDURE — 1159F MED LIST DOCD IN RCRD: CPT | Mod: CPTII,S$GLB,, | Performed by: FAMILY MEDICINE

## 2022-03-08 PROCEDURE — 99396 PR PREVENTIVE VISIT,EST,40-64: ICD-10-PCS | Mod: S$GLB,,, | Performed by: FAMILY MEDICINE

## 2022-03-08 PROCEDURE — 99999 PR PBB SHADOW E&M-EST. PATIENT-LVL III: ICD-10-PCS | Mod: PBBFAC,,, | Performed by: FAMILY MEDICINE

## 2022-03-08 PROCEDURE — 3079F PR MOST RECENT DIASTOLIC BLOOD PRESSURE 80-89 MM HG: ICD-10-PCS | Mod: CPTII,S$GLB,, | Performed by: FAMILY MEDICINE

## 2022-03-08 PROCEDURE — 3074F SYST BP LT 130 MM HG: CPT | Mod: CPTII,S$GLB,, | Performed by: FAMILY MEDICINE

## 2022-03-08 PROCEDURE — 3008F BODY MASS INDEX DOCD: CPT | Mod: CPTII,S$GLB,, | Performed by: FAMILY MEDICINE

## 2022-03-08 PROCEDURE — 99999 PR PBB SHADOW E&M-EST. PATIENT-LVL III: CPT | Mod: PBBFAC,,, | Performed by: FAMILY MEDICINE

## 2022-03-08 NOTE — PROGRESS NOTES
"Chief Complaint   Patient presents with    Hypertension       SUBJECTIVE:   Farhan Munoz is a 41 y.o. male presenting for his annual checkup.   Current Outpatient Medications   Medication Sig Dispense Refill    b complex vitamins capsule Take 1 capsule by mouth once daily.      cortney, Zingiber officinalis, (CORTNEY EXTRACT) 250 mg Cap Take by mouth.      Lactobacillus rhamnosus GG (CULTURELLE) 10 billion cell capsule Take 1 capsule by mouth once daily.      multivitamin capsule Take 1 capsule by mouth once daily.      omega-3 fatty acids/fish oil (FISH OIL-OMEGA-3 FATTY ACIDS) 300-1,000 mg capsule Take by mouth once daily.       No current facility-administered medications for this visit.     Allergies: Patient has no known allergies.   Patient Active Problem List    Diagnosis Date Noted    Prediabetes 12/12/2019    Overweight (BMI 25.0-29.9) 10/11/2019       ROS:  Feeling well. No dyspnea or chest pain on exertion. No abdominal pain, change in bowel habits, black or bloody stools. No urinary tract or prostatic symptoms. No neurological complaints.  Review of Systems   HENT: Negative for hearing loss.    Eyes: Negative for discharge.   Respiratory: Negative for wheezing.    Cardiovascular: Negative for chest pain and palpitations.   Gastrointestinal: Negative for blood in stool, constipation, diarrhea and vomiting.   Genitourinary: Negative for hematuria and urgency.   Musculoskeletal: Negative for neck pain.   Neurological: Negative for weakness and headaches.   Endo/Heme/Allergies: Negative for polydipsia.       OBJECTIVE:   The patient appears well, alert, oriented x 3, in no distress.   /80   Pulse 75   Temp 97.9 °F (36.6 °C) (Oral)   Ht 5' 11" (1.803 m)   Wt 102.6 kg (226 lb 3.1 oz)   SpO2 98%   BMI 31.55 kg/m²   Wt Readings from Last 5 Encounters:   03/08/22 102.6 kg (226 lb 3.1 oz)   05/31/21 90.4 kg (199 lb 4.7 oz)   12/12/19 99.7 kg (219 lb 12.8 oz)   10/11/19 96 kg (211 lb 10.3 oz) "   08/01/18 96.7 kg (213 lb 3 oz)       ENT normal.  Neck supple. No adenopathy or thyromegaly. ESTELLA. Lungs are clear, good air entry, no wheezes, rhonchi or rales. S1 and S2 normal, no murmurs, regular rate and rhythm. Abdomen is soft without tenderness, guarding, mass or organomegaly.  exam: deferred.  Extremities show no edema, normal peripheral pulses. Neurological is normal without focal findings.    ASSESSMENT:   1. Annual physical exam    2. Overweight (BMI 25.0-29.9)    3. Prediabetes      The 10-year ASCVD risk score (Lesliemarilyn BEJARANO Jr., et al., 2013) is: 0.5%    Values used to calculate the score:      Age: 41 years      Sex: Male      Is Non- : No      Diabetic: No      Tobacco smoker: No      Systolic Blood Pressure: 110 mmHg      Is BP treated: No      HDL Cholesterol: 38 mg/dL      Total Cholesterol: 131 mg/dL      PLAN:   Counseled on age appropriate medical preventative services, including age appropriate cancer screenings, over all nutritional health, need for a consistent exercise regimen and an over all push towards maintaining a vigorous and active lifestyle.  Counseled on age appropriate vaccines and discussed upcoming health care needs based on age/gender.  Spent time with patient counseling on need for a good patient/doctor relationship moving forward.  Discussed use of common OTC medications and supplements.  Discussed common dietary aids and use of caffeine and the need for good sleep hygiene and stress management.    Problem List Items Addressed This Visit     Overweight (BMI 25.0-29.9)    Prediabetes      Other Visit Diagnoses     Annual physical exam    -  Primary        Get me new labs

## 2023-06-16 ENCOUNTER — PATIENT OUTREACH (OUTPATIENT)
Dept: ADMINISTRATIVE | Facility: HOSPITAL | Age: 43
End: 2023-06-16
Payer: COMMERCIAL

## 2023-06-16 NOTE — PROGRESS NOTES
Health Maintenance Due   Topic Date Due    COVID-19 Vaccine (1) Never done    Hemoglobin A1c (Prediabetes)  05/31/2022    Lipid Panel  05/31/2022     Chart review done. HM updated. Immunizations reviewed & updated. Care Everywhere updated.

## 2023-06-20 ENCOUNTER — OFFICE VISIT (OUTPATIENT)
Dept: FAMILY MEDICINE | Facility: CLINIC | Age: 43
End: 2023-06-20
Payer: COMMERCIAL

## 2023-06-20 ENCOUNTER — LAB VISIT (OUTPATIENT)
Dept: LAB | Facility: HOSPITAL | Age: 43
End: 2023-06-20
Attending: FAMILY MEDICINE
Payer: COMMERCIAL

## 2023-06-20 VITALS
HEART RATE: 72 BPM | DIASTOLIC BLOOD PRESSURE: 76 MMHG | OXYGEN SATURATION: 99 % | BODY MASS INDEX: 32.1 KG/M2 | SYSTOLIC BLOOD PRESSURE: 124 MMHG | WEIGHT: 229.25 LBS | HEIGHT: 71 IN | TEMPERATURE: 98 F

## 2023-06-20 DIAGNOSIS — Z00.00 ANNUAL PHYSICAL EXAM: ICD-10-CM

## 2023-06-20 DIAGNOSIS — Z00.00 ANNUAL PHYSICAL EXAM: Primary | ICD-10-CM

## 2023-06-20 DIAGNOSIS — R73.03 PREDIABETES: ICD-10-CM

## 2023-06-20 DIAGNOSIS — R79.89 LOW TESTOSTERONE IN MALE: ICD-10-CM

## 2023-06-20 LAB
ALBUMIN SERPL BCP-MCNC: 4.4 G/DL (ref 3.5–5.2)
ALP SERPL-CCNC: 57 U/L (ref 55–135)
ALT SERPL W/O P-5'-P-CCNC: 18 U/L (ref 10–44)
ANION GAP SERPL CALC-SCNC: 8 MMOL/L (ref 8–16)
AST SERPL-CCNC: 22 U/L (ref 10–40)
BASOPHILS # BLD AUTO: 0.03 K/UL (ref 0–0.2)
BASOPHILS NFR BLD: 0.5 % (ref 0–1.9)
BILIRUB SERPL-MCNC: 1.2 MG/DL (ref 0.1–1)
BILIRUB UR QL STRIP: NEGATIVE
BUN SERPL-MCNC: 12 MG/DL (ref 6–20)
CALCIUM SERPL-MCNC: 9.6 MG/DL (ref 8.7–10.5)
CHLORIDE SERPL-SCNC: 104 MMOL/L (ref 95–110)
CHOLEST SERPL-MCNC: 166 MG/DL (ref 120–199)
CHOLEST/HDLC SERPL: 3.8 {RATIO} (ref 2–5)
CLARITY UR: CLEAR
CO2 SERPL-SCNC: 27 MMOL/L (ref 23–29)
COLOR UR: YELLOW
CREAT SERPL-MCNC: 1.1 MG/DL (ref 0.5–1.4)
DIFFERENTIAL METHOD: ABNORMAL
EOSINOPHIL # BLD AUTO: 0 K/UL (ref 0–0.5)
EOSINOPHIL NFR BLD: 0.7 % (ref 0–8)
ERYTHROCYTE [DISTWIDTH] IN BLOOD BY AUTOMATED COUNT: 13.3 % (ref 11.5–14.5)
EST. GFR  (NO RACE VARIABLE): >60 ML/MIN/1.73 M^2
GLUCOSE SERPL-MCNC: 93 MG/DL (ref 70–110)
GLUCOSE UR QL STRIP: NEGATIVE
HCT VFR BLD AUTO: 46.3 % (ref 40–54)
HDLC SERPL-MCNC: 44 MG/DL (ref 40–75)
HDLC SERPL: 26.5 % (ref 20–50)
HGB BLD-MCNC: 15.7 G/DL (ref 14–18)
HGB UR QL STRIP: NEGATIVE
IMM GRANULOCYTES # BLD AUTO: 0.05 K/UL (ref 0–0.04)
IMM GRANULOCYTES NFR BLD AUTO: 0.9 % (ref 0–0.5)
KETONES UR QL STRIP: NEGATIVE
LDLC SERPL CALC-MCNC: 106.8 MG/DL (ref 63–159)
LEUKOCYTE ESTERASE UR QL STRIP: NEGATIVE
LYMPHOCYTES # BLD AUTO: 1.8 K/UL (ref 1–4.8)
LYMPHOCYTES NFR BLD: 31.7 % (ref 18–48)
MCH RBC QN AUTO: 29.4 PG (ref 27–31)
MCHC RBC AUTO-ENTMCNC: 33.9 G/DL (ref 32–36)
MCV RBC AUTO: 87 FL (ref 82–98)
MONOCYTES # BLD AUTO: 0.4 K/UL (ref 0.3–1)
MONOCYTES NFR BLD: 7.7 % (ref 4–15)
NEUTROPHILS # BLD AUTO: 3.3 K/UL (ref 1.8–7.7)
NEUTROPHILS NFR BLD: 58.5 % (ref 38–73)
NITRITE UR QL STRIP: NEGATIVE
NONHDLC SERPL-MCNC: 122 MG/DL
NRBC BLD-RTO: 0 /100 WBC
PH UR STRIP: 6 [PH] (ref 5–8)
PLATELET # BLD AUTO: 162 K/UL (ref 150–450)
PMV BLD AUTO: 10.5 FL (ref 9.2–12.9)
POTASSIUM SERPL-SCNC: 3.8 MMOL/L (ref 3.5–5.1)
PROT SERPL-MCNC: 7.4 G/DL (ref 6–8.4)
PROT UR QL STRIP: NEGATIVE
RBC # BLD AUTO: 5.34 M/UL (ref 4.6–6.2)
SODIUM SERPL-SCNC: 139 MMOL/L (ref 136–145)
SP GR UR STRIP: 1.01 (ref 1–1.03)
TRIGL SERPL-MCNC: 76 MG/DL (ref 30–150)
TSH SERPL DL<=0.005 MIU/L-ACNC: 1.43 UIU/ML (ref 0.4–4)
URATE SERPL-MCNC: 8.3 MG/DL (ref 3.4–7)
URN SPEC COLLECT METH UR: NORMAL
UROBILINOGEN UR STRIP-ACNC: NEGATIVE EU/DL
WBC # BLD AUTO: 5.62 K/UL (ref 3.9–12.7)

## 2023-06-20 PROCEDURE — 3008F PR BODY MASS INDEX (BMI) DOCUMENTED: ICD-10-PCS | Mod: CPTII,S$GLB,, | Performed by: FAMILY MEDICINE

## 2023-06-20 PROCEDURE — 3044F HG A1C LEVEL LT 7.0%: CPT | Mod: CPTII,S$GLB,, | Performed by: FAMILY MEDICINE

## 2023-06-20 PROCEDURE — 80061 LIPID PANEL: CPT | Performed by: FAMILY MEDICINE

## 2023-06-20 PROCEDURE — 3078F PR MOST RECENT DIASTOLIC BLOOD PRESSURE < 80 MM HG: ICD-10-PCS | Mod: CPTII,S$GLB,, | Performed by: FAMILY MEDICINE

## 2023-06-20 PROCEDURE — 80053 COMPREHEN METABOLIC PANEL: CPT | Performed by: FAMILY MEDICINE

## 2023-06-20 PROCEDURE — 3078F DIAST BP <80 MM HG: CPT | Mod: CPTII,S$GLB,, | Performed by: FAMILY MEDICINE

## 2023-06-20 PROCEDURE — 1159F PR MEDICATION LIST DOCUMENTED IN MEDICAL RECORD: ICD-10-PCS | Mod: CPTII,S$GLB,, | Performed by: FAMILY MEDICINE

## 2023-06-20 PROCEDURE — 1160F PR REVIEW ALL MEDS BY PRESCRIBER/CLIN PHARMACIST DOCUMENTED: ICD-10-PCS | Mod: CPTII,S$GLB,, | Performed by: FAMILY MEDICINE

## 2023-06-20 PROCEDURE — 84550 ASSAY OF BLOOD/URIC ACID: CPT | Performed by: FAMILY MEDICINE

## 2023-06-20 PROCEDURE — 81003 URINALYSIS AUTO W/O SCOPE: CPT | Performed by: FAMILY MEDICINE

## 2023-06-20 PROCEDURE — 99396 PR PREVENTIVE VISIT,EST,40-64: ICD-10-PCS | Mod: S$GLB,,, | Performed by: FAMILY MEDICINE

## 2023-06-20 PROCEDURE — 83036 HEMOGLOBIN GLYCOSYLATED A1C: CPT | Performed by: FAMILY MEDICINE

## 2023-06-20 PROCEDURE — 3074F PR MOST RECENT SYSTOLIC BLOOD PRESSURE < 130 MM HG: ICD-10-PCS | Mod: CPTII,S$GLB,, | Performed by: FAMILY MEDICINE

## 2023-06-20 PROCEDURE — 85025 COMPLETE CBC W/AUTO DIFF WBC: CPT | Performed by: FAMILY MEDICINE

## 2023-06-20 PROCEDURE — 84443 ASSAY THYROID STIM HORMONE: CPT | Performed by: FAMILY MEDICINE

## 2023-06-20 PROCEDURE — 84270 ASSAY OF SEX HORMONE GLOBUL: CPT | Performed by: FAMILY MEDICINE

## 2023-06-20 PROCEDURE — 3044F PR MOST RECENT HEMOGLOBIN A1C LEVEL <7.0%: ICD-10-PCS | Mod: CPTII,S$GLB,, | Performed by: FAMILY MEDICINE

## 2023-06-20 PROCEDURE — 99999 PR PBB SHADOW E&M-EST. PATIENT-LVL III: ICD-10-PCS | Mod: PBBFAC,,, | Performed by: FAMILY MEDICINE

## 2023-06-20 PROCEDURE — 84403 ASSAY OF TOTAL TESTOSTERONE: CPT | Performed by: FAMILY MEDICINE

## 2023-06-20 PROCEDURE — 3008F BODY MASS INDEX DOCD: CPT | Mod: CPTII,S$GLB,, | Performed by: FAMILY MEDICINE

## 2023-06-20 PROCEDURE — 3074F SYST BP LT 130 MM HG: CPT | Mod: CPTII,S$GLB,, | Performed by: FAMILY MEDICINE

## 2023-06-20 PROCEDURE — 99999 PR PBB SHADOW E&M-EST. PATIENT-LVL III: CPT | Mod: PBBFAC,,, | Performed by: FAMILY MEDICINE

## 2023-06-20 PROCEDURE — 1159F MED LIST DOCD IN RCRD: CPT | Mod: CPTII,S$GLB,, | Performed by: FAMILY MEDICINE

## 2023-06-20 PROCEDURE — 1160F RVW MEDS BY RX/DR IN RCRD: CPT | Mod: CPTII,S$GLB,, | Performed by: FAMILY MEDICINE

## 2023-06-20 PROCEDURE — 99396 PREV VISIT EST AGE 40-64: CPT | Mod: S$GLB,,, | Performed by: FAMILY MEDICINE

## 2023-06-20 NOTE — PROGRESS NOTES
"Chief Complaint   Patient presents with    Annual Exam       SUBJECTIVE:   Farhan Munoz is a 43 y.o. male presenting for his annual checkup.   Current Outpatient Medications   Medication Sig Dispense Refill    b complex vitamins capsule Take 1 capsule by mouth once daily.      cortney, Zingiber officinalis, (CORTNEY EXTRACT) 250 mg Cap Take by mouth.      Lactobacillus rhamnosus GG (CULTURELLE) 10 billion cell capsule Take 1 capsule by mouth once daily.      multivitamin capsule Take 1 capsule by mouth once daily.      omega-3 fatty acids/fish oil (FISH OIL-OMEGA-3 FATTY ACIDS) 300-1,000 mg capsule Take by mouth once daily.       No current facility-administered medications for this visit.     Allergies: Patient has no known allergies.   Patient Active Problem List    Diagnosis Date Noted    Low testosterone in male 03/08/2022    Prediabetes 12/12/2019    Overweight (BMI 25.0-29.9) 10/11/2019       ROS:  Feeling well. No dyspnea or chest pain on exertion. No abdominal pain, change in bowel habits, black or bloody stools. No urinary tract or prostatic symptoms. No neurological complaints.  Still drinking more EToH then he knows is healthy, but aware of it.    OBJECTIVE:   The patient appears well, alert, oriented x 3, in no distress.   /76   Pulse 72   Temp 97.6 °F (36.4 °C) (Oral)   Ht 5' 11" (1.803 m)   Wt 104 kg (229 lb 4.5 oz)   SpO2 99%   BMI 31.98 kg/m²   Wt Readings from Last 5 Encounters:   06/20/23 104 kg (229 lb 4.5 oz)   03/08/22 102.6 kg (226 lb 3.1 oz)   05/31/21 90.4 kg (199 lb 4.7 oz)   12/12/19 99.7 kg (219 lb 12.8 oz)   10/11/19 96 kg (211 lb 10.3 oz)       ENT normal.  Neck supple. No adenopathy or thyromegaly. ESTELLA. Lungs are clear, good air entry, no wheezes, rhonchi or rales. S1 and S2 normal, no murmurs, regular rate and rhythm. Abdomen is soft without tenderness, guarding, mass or organomegaly.  exam: deferred.  Extremities show no edema, normal peripheral pulses. Neurological is " normal without focal findings.    ASSESSMENT:   1. Annual physical exam    2. Prediabetes    3. Low testosterone in male          PLAN:   Counseled on age appropriate medical preventative services, including age appropriate cancer screenings, over all nutritional health, need for a consistent exercise regimen and an over all push towards maintaining a vigorous and active lifestyle.  Counseled on age appropriate vaccines and discussed upcoming health care needs based on age/gender.  Spent time with patient counseling on need for a good patient/doctor relationship moving forward.  Discussed use of common OTC medications and supplements.  Discussed common dietary aids and use of caffeine and the need for good sleep hygiene and stress management.    Problem List Items Addressed This Visit       Prediabetes    Low testosterone in male    Overview     Seeing the TRT specialist          Other Visit Diagnoses       Annual physical exam    -  Primary    Relevant Orders    CBC Auto Differential    Comprehensive Metabolic Panel    Hemoglobin A1C    Lipid Panel    Urinalysis    Uric Acid    TSH    Testosterone Panel            The 10-year ASCVD risk score (Ap DAVIES, et al., 2019) is: 0.9%    Values used to calculate the score:      Age: 43 years      Sex: Male      Is Non- : No      Diabetic: No      Tobacco smoker: No      Systolic Blood Pressure: 124 mmHg      Is BP treated: No      HDL Cholesterol: 38 mg/dL      Total Cholesterol: 131 mg/dL    Has 0.9% risk

## 2023-06-21 LAB
ESTIMATED AVG GLUCOSE: 94 MG/DL (ref 68–131)
HBA1C MFR BLD: 4.9 % (ref 4–5.6)

## 2023-06-27 LAB
ALBUMIN SERPL-MCNC: 4.5 G/DL (ref 3.6–5.1)
SHBG SERPL-SCNC: 29 NMOL/L (ref 10–50)
TESTOST FREE SERPL-MCNC: 289.4 PG/ML (ref 46–224)
TESTOST SERPL-MCNC: 1433 NG/DL (ref 250–1100)
TESTOSTERONE.FREE+WB SERPL-MCNC: 595.2 NG/DL (ref 110–575)

## 2023-07-10 ENCOUNTER — PATIENT MESSAGE (OUTPATIENT)
Dept: UROLOGY | Facility: CLINIC | Age: 43
End: 2023-07-10
Payer: COMMERCIAL

## 2023-07-14 ENCOUNTER — OFFICE VISIT (OUTPATIENT)
Dept: UROLOGY | Facility: CLINIC | Age: 43
End: 2023-07-14
Payer: COMMERCIAL

## 2023-07-14 VITALS
HEIGHT: 71 IN | SYSTOLIC BLOOD PRESSURE: 144 MMHG | BODY MASS INDEX: 32.1 KG/M2 | DIASTOLIC BLOOD PRESSURE: 81 MMHG | HEART RATE: 82 BPM | WEIGHT: 229.31 LBS

## 2023-07-14 DIAGNOSIS — Z30.09 VASECTOMY EVALUATION: ICD-10-CM

## 2023-07-14 PROCEDURE — 3008F PR BODY MASS INDEX (BMI) DOCUMENTED: ICD-10-PCS | Mod: CPTII,S$GLB,, | Performed by: UROLOGY

## 2023-07-14 PROCEDURE — 3008F BODY MASS INDEX DOCD: CPT | Mod: CPTII,S$GLB,, | Performed by: UROLOGY

## 2023-07-14 PROCEDURE — 3044F PR MOST RECENT HEMOGLOBIN A1C LEVEL <7.0%: ICD-10-PCS | Mod: CPTII,S$GLB,, | Performed by: UROLOGY

## 2023-07-14 PROCEDURE — 99204 OFFICE O/P NEW MOD 45 MIN: CPT | Mod: S$GLB,,, | Performed by: UROLOGY

## 2023-07-14 PROCEDURE — 1159F MED LIST DOCD IN RCRD: CPT | Mod: CPTII,S$GLB,, | Performed by: UROLOGY

## 2023-07-14 PROCEDURE — 99204 PR OFFICE/OUTPT VISIT, NEW, LEVL IV, 45-59 MIN: ICD-10-PCS | Mod: S$GLB,,, | Performed by: UROLOGY

## 2023-07-14 PROCEDURE — 1159F PR MEDICATION LIST DOCUMENTED IN MEDICAL RECORD: ICD-10-PCS | Mod: CPTII,S$GLB,, | Performed by: UROLOGY

## 2023-07-14 PROCEDURE — 3044F HG A1C LEVEL LT 7.0%: CPT | Mod: CPTII,S$GLB,, | Performed by: UROLOGY

## 2023-07-14 PROCEDURE — 3079F DIAST BP 80-89 MM HG: CPT | Mod: CPTII,S$GLB,, | Performed by: UROLOGY

## 2023-07-14 PROCEDURE — 99999 PR PBB SHADOW E&M-EST. PATIENT-LVL IV: ICD-10-PCS | Mod: PBBFAC,,, | Performed by: UROLOGY

## 2023-07-14 PROCEDURE — 3077F PR MOST RECENT SYSTOLIC BLOOD PRESSURE >= 140 MM HG: ICD-10-PCS | Mod: CPTII,S$GLB,, | Performed by: UROLOGY

## 2023-07-14 PROCEDURE — 3077F SYST BP >= 140 MM HG: CPT | Mod: CPTII,S$GLB,, | Performed by: UROLOGY

## 2023-07-14 PROCEDURE — 3079F PR MOST RECENT DIASTOLIC BLOOD PRESSURE 80-89 MM HG: ICD-10-PCS | Mod: CPTII,S$GLB,, | Performed by: UROLOGY

## 2023-07-14 PROCEDURE — 99999 PR PBB SHADOW E&M-EST. PATIENT-LVL IV: CPT | Mod: PBBFAC,,, | Performed by: UROLOGY

## 2023-07-14 RX ORDER — HYDROCODONE BITARTRATE AND ACETAMINOPHEN 5; 325 MG/1; MG/1
1 TABLET ORAL EVERY 6 HOURS PRN
Qty: 10 TABLET | Refills: 0 | Status: SHIPPED | OUTPATIENT
Start: 2023-07-14

## 2023-07-14 RX ORDER — LIDOCAINE HYDROCHLORIDE 20 MG/ML
10 INJECTION, SOLUTION INFILTRATION; PERINEURAL ONCE
Status: CANCELLED | OUTPATIENT
Start: 2023-07-14 | End: 2023-07-14

## 2023-07-14 RX ORDER — DIAZEPAM 5 MG/1
15 TABLET ORAL
Qty: 3 TABLET | Refills: 0 | Status: SHIPPED | OUTPATIENT
Start: 2023-07-14

## 2023-07-14 RX ORDER — DOXYCYCLINE 100 MG/1
100 CAPSULE ORAL 2 TIMES DAILY
Qty: 6 CAPSULE | Refills: 0 | Status: SHIPPED | OUTPATIENT
Start: 2023-07-14 | End: 2023-07-17

## 2023-07-14 NOTE — PROGRESS NOTES
CC: vasectomy for sterilization    Farhan Munoz is a 43 y.o. man who is here for the evaluation of vasectomy  A new pt referred by his PCP, Rik Fry MD   Is a father of two boys ( 5 and 2 years old).  Is interested in vasectomy for sterilization.  He is wondering whether he can do sperms banking if desires.  Currently the couple is not using any contraceptive method.    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      No past medical history on file.  Past Surgical History:   Procedure Laterality Date    KNEE SURGERY      15yrs     Social History     Tobacco Use    Smoking status: Never    Smokeless tobacco: Never   Substance Use Topics    Alcohol use: Yes    Drug use: No     Family History   Problem Relation Age of Onset    Pancreatic cancer Father      Allergy:  Review of patient's allergies indicates:  No Known Allergies  Outpatient Encounter Medications as of 7/14/2023   Medication Sig Dispense Refill    b complex vitamins capsule Take 1 capsule by mouth once daily.      diazePAM (VALIUM) 5 MG tablet Take 3 tablets (15 mg total) by mouth as needed (before vasectomy). Take all 3 pills 1 hour before the procedure 3 tablet 0    doxycycline (VIBRAMYCIN) 100 MG Cap Take 1 capsule (100 mg total) by mouth 2 (two) times daily. Take the first tablet 1 hour before the procedure, then twice a day. for 6 doses 6 capsule 0    ginger, Zingiber officinalis, (GINGER EXTRACT) 250 mg Cap Take by mouth.      HYDROcodone-acetaminophen (NORCO) 5-325 mg per tablet Take 1 tablet by mouth every 6 (six) hours as needed for Pain. 10 tablet 0    Lactobacillus rhamnosus GG (CULTURELLE) 10 billion cell capsule Take 1 capsule by mouth once daily.      multivitamin capsule Take 1 capsule by mouth once daily.      omega-3 fatty acids/fish oil (FISH OIL-OMEGA-3 FATTY ACIDS) 300-1,000 mg capsule Take by mouth once daily.       No facility-administered encounter medications on file as of 7/14/2023.     Review of Systems    ROS  Physical Exam   There were no vitals filed for this visit.  Physical Exam  Genitalia:  Scrotum: no rash or lesion  Normal symmetric epididymis without masses  Normal vas palpated  Normal size, symmetric testicles with no masses   Normal urethral meatus with no discharge  Normal circumcised penis with no lesion         LABS:  No results found for: PSA, PSADIAG, PSATOTAL, PSAFREE, PSAFREEPCT  No results found for this or any previous visit.  Lab Results   Component Value Date    CREATININE 1.1 06/20/2023    CREATININE 1.1 05/31/2021    CREATININE 1.1 11/25/2019     No results found for this or any previous visit.  No results found for: LABURIN  Hemoglobin A1C   Date Value Ref Range Status   06/20/2023 4.9 4.0 - 5.6 % Final     Comment:     ADA Screening Guidelines:  5.7-6.4%  Consistent with prediabetes  >or=6.5%  Consistent with diabetes    High levels of fetal hemoglobin interfere with the HbA1C  assay. Heterozygous hemoglobin variants (HbS, HgC, etc)do  not significantly interfere with this assay.   However, presence of multiple variants may affect accuracy.     05/31/2021 5.0 4.0 - 5.6 % Final     Comment:     ADA Screening Guidelines:  5.7-6.4%  Consistent with prediabetes  >or=6.5%  Consistent with diabetes    High levels of fetal hemoglobin interfere with the HbA1C  assay. Heterozygous hemoglobin variants (HbS, HgC, etc)do  not significantly interfere with this assay.   However, presence of multiple variants may affect accuracy.         Radiology:    Assessment and Plan:  Diagnoses and all orders for this visit:    Vasectomy evaluation  -     Vasectomy; Future  -     diazePAM (VALIUM) 5 MG tablet; Take 3 tablets (15 mg total) by mouth as needed (before vasectomy). Take all 3 pills 1 hour before the procedure  -     doxycycline (VIBRAMYCIN) 100 MG Cap; Take 1 capsule (100 mg total) by mouth 2 (two) times daily. Take the first tablet 1 hour before the procedure, then twice a day. for 6 doses  -      HYDROcodone-acetaminophen (NORCO) 5-325 mg per tablet; Take 1 tablet by mouth every 6 (six) hours as needed for Pain.    Other orders  -     LIDOcaine HCL 20 mg/ml (2%) injection 10 mL    The risks and benefits of vasectomy were discussed with the patient in detail.    The risks include bleeding, infection, pain, scarring, chronic pain, sperm granuloma, recannulization and loss of testicular function.    The patient was informed that the long term effects of vasectomy are unknown and could be associated with a higher risk of prostate cancer.    He will make sure he is off all blood thinners 1 week prior.    Consent was obtained.  The patient will be scheduled for vasectomy.     A prescription was written for Valium and Doxycline pre-op and Hydrocodone post-vasectomy.  Detailed instruction on how to take valium and Doxycycline was given.     He will shave the scrotum as instructed and take an antibiotic shower prior to his vasectomy.  Also recommend that he should bring a jock strap with him for scrotal support after vasectomy.     Follow-up:  Follow up vasectomy.

## 2023-07-27 ENCOUNTER — PROCEDURE VISIT (OUTPATIENT)
Dept: UROLOGY | Facility: CLINIC | Age: 43
End: 2023-07-27
Payer: COMMERCIAL

## 2023-07-27 VITALS
TEMPERATURE: 98 F | HEART RATE: 80 BPM | SYSTOLIC BLOOD PRESSURE: 133 MMHG | RESPIRATION RATE: 17 BRPM | DIASTOLIC BLOOD PRESSURE: 84 MMHG | WEIGHT: 226 LBS | BODY MASS INDEX: 31.52 KG/M2

## 2023-07-27 DIAGNOSIS — Z30.09 VASECTOMY EVALUATION: ICD-10-CM

## 2023-07-27 PROCEDURE — 55250 REMOVAL OF SPERM DUCT(S): CPT | Mod: S$GLB,,, | Performed by: UROLOGY

## 2023-07-27 PROCEDURE — 55250 VASECTOMY: ICD-10-PCS | Mod: S$GLB,,, | Performed by: UROLOGY

## 2023-07-27 RX ORDER — LIDOCAINE HYDROCHLORIDE 20 MG/ML
10 INJECTION, SOLUTION INFILTRATION; PERINEURAL ONCE
Status: COMPLETED | OUTPATIENT
Start: 2023-07-27 | End: 2023-07-27

## 2023-07-27 RX ADMIN — LIDOCAINE HYDROCHLORIDE 10 ML: 20 INJECTION, SOLUTION INFILTRATION; PERINEURAL at 09:07

## 2023-07-27 NOTE — PATIENT INSTRUCTIONS
What to Expect After a Vasectomy  You cannot drive or operate heavy machinery on the day of the procedure.    Apply ice packs to the scrotal area for 24-48 hours. Avoid direct contact of the ice pack with the skin. Scrotal supports, jock straps, or fitted underwear help elevate the scrotum and reduce discomfort.    You may shower the next day. Gently apply soapy water to the scrotum to wash. Rinse and dry yourself by blotting the skin, not rubbing.    Avoid strenuous physical exercises or sexual relations for at least one week after a vasectomy.    Continue to use birth control for at least 6 weeks or 10-20 ejaculations. You are still considered fertile until your urologist examines a post-vasectomy semen analysis at 6 weeks and perhaps one at 8 weeks as well. Drop off the specimen at the 4th floor [facility] between 8am and 4pm.    Do NOT resume unprotected sexual activity until your physician finds no sperm in your semen.    All stitches will dissolve on their own in 1-2 weeks.    Signs and Symptoms to Report  A large amount of bleeding at the site  An unusual amount of pain  A large amount of swelling in the scrotum  Fever and chills  Any signs of infection, such as redness at the site or foul-smelling drainage    Risks  The risks of complication after vasectomy are very low. A few of the risks include:  Bleeding  Infection  Scrotal hematoma - a collection of blood in the scrotum  Inflammation of the epididymis - inflammation of a structure next to the testicle that helps in maturation of sperm  Sperm granuloma - a collection of sperm that leaks out from the vas deferens, forming a small nodule or lump. This does not usually cause any discomfort but you may feel it in the scrotum.  Recanalization - the restoration of the lumen or transport tube between the two ends of the vas deferens, possibly causing fertility    If you have any questions or concerns, please call your [facility] urologist at  [phone].

## 2023-07-27 NOTE — PROCEDURES
Vasectomy    Date/Time: 7/27/2023 8:45 AM  Performed by: Aydin Acharya MD  Authorized by: Aydin Acharya MD        Procedure Date:  07/27/2023    Procedure:                                                                        Bilateral Vasectomy                                                                                              Pre-OP Diagnosis:                                                                 Patient requests sterilization                                          Post-OP Diagnosis:                                                                Patient requests sterilization                                          Anesthesia:                                                                       Anesthesia Administered:                                                     Local anesthetic infiltration, using 6 mL of 2% lidocaine                    (Xylocaine) without epinephrine injected into the skin /                     subcutaneous tissues (administered by surgeon).                         Findings:                                                                         Vas identified bilaterally.                                   Description of Procedure:                                                         Informed Consent:                                                            - Risks, benefits and alternatives of procedure discussed with               patient and informed consent obtained.                                       Patient Position:                                                            - Supine.                                                                    Prep and Drape:                                                              - Patient prepped and draped in usual sterile fashion using povidone         iodine (Betadine).                                                           Incision(s):                                                                  - bilateral scrotal incision ( 1 cm or less).                                                         Instruments:                                                                 - Ring clamp and sharp mosquito.                                             Procedure Details:                                                           - Vas Ends Occluded with: Clips.                                             - Methods of Vas Occlusion: Clips.                                           - Length of Vas Excised: 1.5 cm.                                             Closure:                                                                     - Subcutaneous Tissue: 3-0 Chromic.                                          - Skin: 3-0 Chromic interrupted.                                             Wound Dressing:                                                              - Wound Dressed with: 4 x 4 gauze and scrotal supporter.                     Sponge / Instrument / Needle Counts:                                         - Final counts correct.                                                      Estimated Blood Loss:                                                        - Minimal.                                                                   Pathology Specimen:                                                          - none                                                  Complications:                                                                    No immediate complications.                                             Post-OP Plan:                                                                     Patient was discharged home in a stable condition.         Medications: abx, pain medication given before.         Follow up: SA in 1 month.                                                               Post Vasectomy instructions given.

## 2023-08-04 ENCOUNTER — TELEPHONE (OUTPATIENT)
Dept: UROLOGY | Facility: CLINIC | Age: 43
End: 2023-08-04
Payer: COMMERCIAL

## 2023-08-04 ENCOUNTER — PATIENT MESSAGE (OUTPATIENT)
Dept: UROLOGY | Facility: CLINIC | Age: 43
End: 2023-08-04
Payer: COMMERCIAL

## 2023-08-04 DIAGNOSIS — Z98.52 S/P VASECTOMY: Primary | ICD-10-CM

## 2023-08-04 DIAGNOSIS — N50.89 SCROTAL SWELLING: ICD-10-CM

## 2023-08-04 RX ORDER — DOXYCYCLINE 100 MG/1
100 CAPSULE ORAL 2 TIMES DAILY
Qty: 14 CAPSULE | Refills: 0 | Status: SHIPPED | OUTPATIENT
Start: 2023-08-04 | End: 2023-08-11

## 2023-08-04 RX ORDER — MELOXICAM 15 MG/1
15 TABLET ORAL DAILY
Qty: 15 TABLET | Refills: 0 | Status: SHIPPED | OUTPATIENT
Start: 2023-08-04 | End: 2023-08-19

## 2023-08-04 NOTE — TELEPHONE ENCOUNTER
S/P vasectomy  -     meloxicam (MOBIC) 15 MG tablet; Take 1 tablet (15 mg total) by mouth once daily. for 15 days  Dispense: 15 tablet; Refill: 0  -     doxycycline (VIBRAMYCIN) 100 MG Cap; Take 1 capsule (100 mg total) by mouth 2 (two) times daily. for 7 days  Dispense: 14 capsule; Refill: 0    Scrotal swelling  -     meloxicam (MOBIC) 15 MG tablet; Take 1 tablet (15 mg total) by mouth once daily. for 15 days  Dispense: 15 tablet; Refill: 0  -     doxycycline (VIBRAMYCIN) 100 MG Cap; Take 1 capsule (100 mg total) by mouth 2 (two) times daily. for 7 days  Dispense: 14 capsule; Refill: 0

## 2023-08-10 ENCOUNTER — OFFICE VISIT (OUTPATIENT)
Dept: UROLOGY | Facility: CLINIC | Age: 43
End: 2023-08-10
Payer: COMMERCIAL

## 2023-08-10 VITALS
BODY MASS INDEX: 32.44 KG/M2 | WEIGHT: 231.69 LBS | DIASTOLIC BLOOD PRESSURE: 85 MMHG | SYSTOLIC BLOOD PRESSURE: 137 MMHG | HEIGHT: 71 IN | HEART RATE: 75 BPM

## 2023-08-10 DIAGNOSIS — Z98.52 S/P VASECTOMY: ICD-10-CM

## 2023-08-10 PROCEDURE — 99999 PR PBB SHADOW E&M-EST. PATIENT-LVL III: ICD-10-PCS | Mod: PBBFAC,,, | Performed by: UROLOGY

## 2023-08-10 PROCEDURE — 99999 PR PBB SHADOW E&M-EST. PATIENT-LVL III: CPT | Mod: PBBFAC,,, | Performed by: UROLOGY

## 2023-08-10 PROCEDURE — 99499 UNLISTED E&M SERVICE: CPT | Mod: S$GLB,,, | Performed by: UROLOGY

## 2023-08-10 PROCEDURE — 99499 NO LOS: ICD-10-PCS | Mod: S$GLB,,, | Performed by: UROLOGY

## 2023-08-10 NOTE — PROGRESS NOTES
CC: vasectomy for sterilization    Farhan Munoz is a 43 y.o. man who is s/p vasectomy 7/24. Complained of pain and swelling 1 week after vasectomy. Sent mobic and doxycyline and he states swelling and pain improved.     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      History reviewed. No pertinent past medical history.  Past Surgical History:   Procedure Laterality Date    KNEE SURGERY      15yrs     Social History     Tobacco Use    Smoking status: Never    Smokeless tobacco: Never   Substance Use Topics    Alcohol use: Yes    Drug use: No     Family History   Problem Relation Age of Onset    Pancreatic cancer Father      Allergy:  Review of patient's allergies indicates:  No Known Allergies  Outpatient Encounter Medications as of 8/10/2023   Medication Sig Dispense Refill    b complex vitamins capsule Take 1 capsule by mouth once daily.      doxycycline (VIBRAMYCIN) 100 MG Cap Take 1 capsule (100 mg total) by mouth 2 (two) times daily. for 7 days 14 capsule 0    ginger, Zingiber officinalis, (GINGER EXTRACT) 250 mg Cap Take by mouth.      meloxicam (MOBIC) 15 MG tablet Take 1 tablet (15 mg total) by mouth once daily. for 15 days 15 tablet 0    multivitamin capsule Take 1 capsule by mouth once daily.      omega-3 fatty acids/fish oil (FISH OIL-OMEGA-3 FATTY ACIDS) 300-1,000 mg capsule Take by mouth once daily.      diazePAM (VALIUM) 5 MG tablet Take 3 tablets (15 mg total) by mouth as needed (before vasectomy). Take all 3 pills 1 hour before the procedure (Patient not taking: Reported on 8/10/2023) 3 tablet 0    HYDROcodone-acetaminophen (NORCO) 5-325 mg per tablet Take 1 tablet by mouth every 6 (six) hours as needed for Pain. (Patient not taking: Reported on 8/10/2023) 10 tablet 0    Lactobacillus rhamnosus GG (CULTURELLE) 10 billion cell capsule Take 1 capsule by mouth once daily.       No facility-administered encounter medications on file as of 8/10/2023.     Review of Systems   Review of Systems  "  Constitutional:  Negative for chills, fever and weight loss.   HENT:  Negative for congestion and sore throat.    Respiratory:  Negative for cough and shortness of breath.    Cardiovascular:  Negative for chest pain and leg swelling.   Gastrointestinal:  Negative for abdominal pain, nausea and vomiting.   Genitourinary:  Negative for dysuria, frequency and hematuria.   Neurological:  Negative for focal weakness and headaches.     Physical Exam     Vitals:    08/10/23 1444   BP: 137/85   Pulse: 75     Physical Exam  Genitalia:  Scrotum: no rash or lesion  Normal symmetric epididymis without masses  Normal size, symmetric testicles with no masses   Normal urethral meatus with no discharge  Normal circumcised penis with no lesion    Scrotum normal without evidence of hematoma.      LABS:  No results found for: "PSA", "PSADIAG", "PSATOTAL", "PSAFREE", "PSAFREEPCT"  No results found for this or any previous visit.  Lab Results   Component Value Date    CREATININE 1.1 06/20/2023    CREATININE 1.1 05/31/2021    CREATININE 1.1 11/25/2019     No results found for this or any previous visit.  No results found for: "LABURIN"  Hemoglobin A1C   Date Value Ref Range Status   06/20/2023 4.9 4.0 - 5.6 % Final     Comment:     ADA Screening Guidelines:  5.7-6.4%  Consistent with prediabetes  >or=6.5%  Consistent with diabetes    High levels of fetal hemoglobin interfere with the HbA1C  assay. Heterozygous hemoglobin variants (HbS, HgC, etc)do  not significantly interfere with this assay.   However, presence of multiple variants may affect accuracy.     05/31/2021 5.0 4.0 - 5.6 % Final     Comment:     ADA Screening Guidelines:  5.7-6.4%  Consistent with prediabetes  >or=6.5%  Consistent with diabetes    High levels of fetal hemoglobin interfere with the HbA1C  assay. Heterozygous hemoglobin variants (HbS, HgC, etc)do  not significantly interfere with this assay.   However, presence of multiple variants may affect accuracy.   "       Radiology:    Assessment and Plan:  Farhan was seen today for other.    Diagnoses and all orders for this visit:    S/P vasectomy      S/p vasectomy.  Suture removed today. Continue mobic as needed.     Follow-up:  Follow up post vasectomy SAEvin

## 2023-09-07 ENCOUNTER — TELEPHONE (OUTPATIENT)
Dept: UROLOGY | Facility: CLINIC | Age: 43
End: 2023-09-07
Payer: COMMERCIAL

## 2023-09-21 ENCOUNTER — PATIENT MESSAGE (OUTPATIENT)
Dept: UROLOGY | Facility: CLINIC | Age: 43
End: 2023-09-21
Payer: COMMERCIAL

## 2024-10-08 ENCOUNTER — OFFICE VISIT (OUTPATIENT)
Dept: FAMILY MEDICINE | Facility: CLINIC | Age: 44
End: 2024-10-08
Payer: COMMERCIAL

## 2024-10-08 ENCOUNTER — LAB VISIT (OUTPATIENT)
Dept: LAB | Facility: HOSPITAL | Age: 44
End: 2024-10-08
Attending: FAMILY MEDICINE
Payer: COMMERCIAL

## 2024-10-08 VITALS
OXYGEN SATURATION: 99 % | BODY MASS INDEX: 31.79 KG/M2 | SYSTOLIC BLOOD PRESSURE: 136 MMHG | TEMPERATURE: 98 F | WEIGHT: 227.06 LBS | HEART RATE: 72 BPM | HEIGHT: 71 IN | DIASTOLIC BLOOD PRESSURE: 76 MMHG

## 2024-10-08 DIAGNOSIS — R79.89 LOW TESTOSTERONE IN MALE: ICD-10-CM

## 2024-10-08 DIAGNOSIS — Z00.00 ANNUAL PHYSICAL EXAM: Primary | ICD-10-CM

## 2024-10-08 DIAGNOSIS — Z00.00 ANNUAL PHYSICAL EXAM: ICD-10-CM

## 2024-10-08 LAB
ALBUMIN SERPL BCP-MCNC: 4.5 G/DL (ref 3.5–5.2)
ALP SERPL-CCNC: 48 U/L (ref 55–135)
ALT SERPL W/O P-5'-P-CCNC: 22 U/L (ref 10–44)
ANION GAP SERPL CALC-SCNC: 9 MMOL/L (ref 8–16)
AST SERPL-CCNC: 25 U/L (ref 10–40)
BASOPHILS # BLD AUTO: 0.01 K/UL (ref 0–0.2)
BASOPHILS NFR BLD: 0.2 % (ref 0–1.9)
BILIRUB SERPL-MCNC: 1 MG/DL (ref 0.1–1)
BILIRUB UR QL STRIP: NEGATIVE
BUN SERPL-MCNC: 11 MG/DL (ref 6–20)
CALCIUM SERPL-MCNC: 9.8 MG/DL (ref 8.7–10.5)
CHLORIDE SERPL-SCNC: 106 MMOL/L (ref 95–110)
CHOLEST SERPL-MCNC: 180 MG/DL (ref 120–199)
CHOLEST/HDLC SERPL: 3.2 {RATIO} (ref 2–5)
CLARITY UR: CLEAR
CO2 SERPL-SCNC: 26 MMOL/L (ref 23–29)
COLOR UR: YELLOW
COMPLEXED PSA SERPL-MCNC: 0.53 NG/ML (ref 0–4)
CREAT SERPL-MCNC: 1.1 MG/DL (ref 0.5–1.4)
DIFFERENTIAL METHOD BLD: NORMAL
EOSINOPHIL # BLD AUTO: 0.1 K/UL (ref 0–0.5)
EOSINOPHIL NFR BLD: 1.2 % (ref 0–8)
ERYTHROCYTE [DISTWIDTH] IN BLOOD BY AUTOMATED COUNT: 13.3 % (ref 11.5–14.5)
EST. GFR  (NO RACE VARIABLE): >60 ML/MIN/1.73 M^2
ESTIMATED AVG GLUCOSE: 100 MG/DL (ref 68–131)
GLUCOSE SERPL-MCNC: 97 MG/DL (ref 70–110)
GLUCOSE UR QL STRIP: NEGATIVE
HBA1C MFR BLD: 5.1 % (ref 4–5.6)
HCT VFR BLD AUTO: 45.8 % (ref 40–54)
HDLC SERPL-MCNC: 56 MG/DL (ref 40–75)
HDLC SERPL: 31.1 % (ref 20–50)
HGB BLD-MCNC: 15.7 G/DL (ref 14–18)
HGB UR QL STRIP: NEGATIVE
IMM GRANULOCYTES # BLD AUTO: 0.02 K/UL (ref 0–0.04)
IMM GRANULOCYTES NFR BLD AUTO: 0.3 % (ref 0–0.5)
KETONES UR QL STRIP: NEGATIVE
LDLC SERPL CALC-MCNC: 113.2 MG/DL (ref 63–159)
LEUKOCYTE ESTERASE UR QL STRIP: NEGATIVE
LYMPHOCYTES # BLD AUTO: 1.9 K/UL (ref 1–4.8)
LYMPHOCYTES NFR BLD: 32.3 % (ref 18–48)
MCH RBC QN AUTO: 30.4 PG (ref 27–31)
MCHC RBC AUTO-ENTMCNC: 34.3 G/DL (ref 32–36)
MCV RBC AUTO: 89 FL (ref 82–98)
MONOCYTES # BLD AUTO: 0.5 K/UL (ref 0.3–1)
MONOCYTES NFR BLD: 8 % (ref 4–15)
NEUTROPHILS # BLD AUTO: 3.4 K/UL (ref 1.8–7.7)
NEUTROPHILS NFR BLD: 58 % (ref 38–73)
NITRITE UR QL STRIP: NEGATIVE
NONHDLC SERPL-MCNC: 124 MG/DL
NRBC BLD-RTO: 0 /100 WBC
PH UR STRIP: 7 [PH] (ref 5–8)
PLATELET # BLD AUTO: 184 K/UL (ref 150–450)
PMV BLD AUTO: 10 FL (ref 9.2–12.9)
POTASSIUM SERPL-SCNC: 4.1 MMOL/L (ref 3.5–5.1)
PROT SERPL-MCNC: 7.4 G/DL (ref 6–8.4)
PROT UR QL STRIP: NEGATIVE
RBC # BLD AUTO: 5.16 M/UL (ref 4.6–6.2)
SODIUM SERPL-SCNC: 141 MMOL/L (ref 136–145)
SP GR UR STRIP: 1.01 (ref 1–1.03)
TRIGL SERPL-MCNC: 54 MG/DL (ref 30–150)
TSH SERPL DL<=0.005 MIU/L-ACNC: 1.41 UIU/ML (ref 0.4–4)
URN SPEC COLLECT METH UR: NORMAL
UROBILINOGEN UR STRIP-ACNC: NEGATIVE EU/DL
WBC # BLD AUTO: 5.88 K/UL (ref 3.9–12.7)

## 2024-10-08 PROCEDURE — 99396 PREV VISIT EST AGE 40-64: CPT | Mod: S$GLB,,, | Performed by: FAMILY MEDICINE

## 2024-10-08 PROCEDURE — 84403 ASSAY OF TOTAL TESTOSTERONE: CPT | Performed by: FAMILY MEDICINE

## 2024-10-08 PROCEDURE — 80053 COMPREHEN METABOLIC PANEL: CPT | Performed by: FAMILY MEDICINE

## 2024-10-08 PROCEDURE — 81003 URINALYSIS AUTO W/O SCOPE: CPT | Performed by: FAMILY MEDICINE

## 2024-10-08 PROCEDURE — 85025 COMPLETE CBC W/AUTO DIFF WBC: CPT | Performed by: FAMILY MEDICINE

## 2024-10-08 PROCEDURE — 82040 ASSAY OF SERUM ALBUMIN: CPT | Performed by: FAMILY MEDICINE

## 2024-10-08 PROCEDURE — 84443 ASSAY THYROID STIM HORMONE: CPT | Performed by: FAMILY MEDICINE

## 2024-10-08 PROCEDURE — 3008F BODY MASS INDEX DOCD: CPT | Mod: CPTII,S$GLB,, | Performed by: FAMILY MEDICINE

## 2024-10-08 PROCEDURE — 84270 ASSAY OF SEX HORMONE GLOBUL: CPT | Performed by: FAMILY MEDICINE

## 2024-10-08 PROCEDURE — 36415 COLL VENOUS BLD VENIPUNCTURE: CPT | Mod: PO | Performed by: FAMILY MEDICINE

## 2024-10-08 PROCEDURE — 84153 ASSAY OF PSA TOTAL: CPT | Performed by: FAMILY MEDICINE

## 2024-10-08 PROCEDURE — 3075F SYST BP GE 130 - 139MM HG: CPT | Mod: CPTII,S$GLB,, | Performed by: FAMILY MEDICINE

## 2024-10-08 PROCEDURE — 83036 HEMOGLOBIN GLYCOSYLATED A1C: CPT | Performed by: FAMILY MEDICINE

## 2024-10-08 PROCEDURE — 3078F DIAST BP <80 MM HG: CPT | Mod: CPTII,S$GLB,, | Performed by: FAMILY MEDICINE

## 2024-10-08 PROCEDURE — 80061 LIPID PANEL: CPT | Performed by: FAMILY MEDICINE

## 2024-10-08 PROCEDURE — 99999 PR PBB SHADOW E&M-EST. PATIENT-LVL III: CPT | Mod: PBBFAC,,, | Performed by: FAMILY MEDICINE

## 2024-10-08 PROCEDURE — 1159F MED LIST DOCD IN RCRD: CPT | Mod: CPTII,S$GLB,, | Performed by: FAMILY MEDICINE

## 2024-10-08 RX ORDER — FINASTERIDE 1 MG/1
1 TABLET, FILM COATED ORAL
COMMUNITY
Start: 2024-09-23

## 2024-10-08 RX ORDER — MINOXIDIL 2.5 MG/1
TABLET ORAL
COMMUNITY
Start: 2024-09-23

## 2024-10-08 NOTE — PROGRESS NOTES
"Chief Complaint   Patient presents with    Annual Exam       SUBJECTIVE:   Farhan Munoz is a 44 y.o. male presenting for his annual checkup.   Current Outpatient Medications   Medication Sig Dispense Refill    b complex vitamins capsule Take 1 capsule by mouth once daily.      finasteride (PROPECIA) 1 mg tablet Take 1 mg by mouth.      cortney, Zingiber officinalis, (CORTNEY EXTRACT) 250 mg Cap Take by mouth.      Lactobacillus rhamnosus GG (CULTURELLE) 10 billion cell capsule Take 1 capsule by mouth once daily.      minoxidiL (LONITEN) 2.5 MG tablet Take by mouth.      multivitamin capsule Take 1 capsule by mouth once daily.      omega-3 fatty acids/fish oil (FISH OIL-OMEGA-3 FATTY ACIDS) 300-1,000 mg capsule Take by mouth once daily.       No current facility-administered medications for this visit.     Allergies: Patient has no known allergies.   Patient Active Problem List    Diagnosis Date Noted    S/P vasectomy 08/10/2023    Vasectomy evaluation 07/14/2023    Low testosterone in male 03/08/2022    Prediabetes 12/12/2019    Overweight (BMI 25.0-29.9) 10/11/2019       ROS:  Feeling well. No dyspnea or chest pain on exertion. No abdominal pain, change in bowel habits, black or bloody stools. No urinary tract or prostatic symptoms. No neurological complaints.    OBJECTIVE:   The patient appears well, alert, oriented x 3, in no distress.   /76   Pulse 72   Temp 98.3 °F (36.8 °C) (Oral)   Ht 5' 11" (1.803 m)   Wt 103 kg (227 lb 1.2 oz)   SpO2 99%   BMI 31.67 kg/m²   Wt Readings from Last 5 Encounters:   10/08/24 103 kg (227 lb 1.2 oz)   08/10/23 105.1 kg (231 lb 11.3 oz)   07/27/23 102.5 kg (225 lb 15.5 oz)   07/14/23 104 kg (229 lb 4.8 oz)   06/20/23 104 kg (229 lb 4.5 oz)       ENT normal.  Neck supple. No adenopathy or thyromegaly. ESTELLA. Lungs are clear, good air entry, no wheezes, rhonchi or rales. S1 and S2 normal, no murmurs, regular rate and rhythm. Abdomen is soft without tenderness, guarding, mass " or organomegaly.  exam: deferred.  Extremities show no edema, normal peripheral pulses. Neurological is normal without focal findings.    ASSESSMENT:   1. Annual physical exam          PLAN:   Counseled on age appropriate medical preventative services, including age appropriate cancer screenings, over all nutritional health, need for a consistent exercise regimen and an over all push towards maintaining a vigorous and active lifestyle.  Counseled on age appropriate vaccines and discussed upcoming health care needs based on age/gender.  Spent time with patient counseling on need for a good patient/doctor relationship moving forward.  Discussed use of common OTC medications and supplements.  Discussed common dietary aids and use of caffeine and the need for good sleep hygiene and stress management.    Problem List Items Addressed This Visit    None  Visit Diagnoses       Annual physical exam    -  Primary    Relevant Orders    CBC Auto Differential    Comprehensive Metabolic Panel    Urinalysis, Reflex to Urine Culture Urine, Clean Catch    PSA, Screening    Hemoglobin A1C    Lipid Panel    TSH    Testosterone Panel

## 2024-10-15 LAB
ALBUMIN SERPL-MCNC: 4.9 G/DL (ref 3.6–5.1)
SHBG SERPL-SCNC: 32 NMOL/L (ref 10–50)

## 2025-03-10 LAB — CRC RECOMMENDATION EXT: NORMAL

## 2025-03-14 ENCOUNTER — PATIENT OUTREACH (OUTPATIENT)
Dept: ADMINISTRATIVE | Facility: HOSPITAL | Age: 45
End: 2025-03-14
Payer: COMMERCIAL

## 2025-04-11 ENCOUNTER — E-VISIT (OUTPATIENT)
Dept: FAMILY MEDICINE | Facility: CLINIC | Age: 45
End: 2025-04-11
Payer: COMMERCIAL

## 2025-04-11 DIAGNOSIS — G47.00 INSOMNIA, UNSPECIFIED TYPE: Primary | ICD-10-CM

## 2025-04-11 RX ORDER — TRAZODONE HYDROCHLORIDE 50 MG/1
50-150 TABLET ORAL NIGHTLY PRN
Qty: 90 TABLET | Refills: 0 | Status: SHIPPED | OUTPATIENT
Start: 2025-04-11 | End: 2026-04-11

## 2025-04-12 NOTE — PROGRESS NOTES
Patient ID: Farhan Munoz is a 44 y.o. male.    Chief Complaint: Insomnia (Entered automatically based on patient selection in [employer].)    The patient initiated a request through [employer] on 4/11/2025 for evaluation and management with a chief complaint of Insomnia (Entered automatically based on patient selection in [employer].)     I evaluated the questionnaire submission on 04/11/2025  .    [facility] Peq Evisit Insomnia    4/11/2025 11:54 AM CDT - Filed by Patient   Do you agree to participate in an E-Visit? Yes   If you have any of the following symptoms, please go to the nearest emergency room or call 911. I acknowledge   Choose the state of your primary residence [place]   What is the main issue you would like addressed today? Inability to sleep and anxiety   In the past two weeks, how would you rate your difficulty with  falling asleep? Severe   In the past two weeks, how would you rate your difficulty with staying asleep? Severe   How often do you wake up early? Most days   How many days a week do you have a problem with your sleep?  5-7   How would you rate the quality of your sleep? Poor   Has anyone noticed you stop breathing during sleep? Not sure   Do you have any of the following symptoms? Anxiety;  Depression;  Mind races with many thoughts   How long have you had insomnia? Less than a month   Before your insomnia problem started, did you have any of the following? Anxiety;  Depression;  More or worse stress   I would like to address: Medication for Insomnia   Do you want to address a new or existing medication? I would like to start a new medication that I do not already take   What is the name of the medication that you would like to start? I dont know the medicine Dr Fry   Have you taken a similar medication in the past? No   Why are you requesting this particular medication? I cant sleep.    What medical condition is the  medication intended to treat? Waking up in night with racing thoughts.   Provide  any additional information you feel is important. High stress levels and large anxiety.   Please attach any relevant images or files    Are you able to take your vital signs? No       Texted with him  Encounter Diagnosis   Name Primary?    Insomnia, unspecified type Yes        No orders of the defined types were placed in this encounter.       More texts to get context.  We will treat just as night for insomnia and anxiety  Get him more ready for his day  Watch for any lingering morning effects.    Trazodone   Potential medication side effects were discussed with the patient; let me know if any occur.      No follow-ups on file.      E-Visit Time Tracking:    Day 1 Time (in minutes): 12    Total Time (in minutes): 12

## 2025-08-06 ENCOUNTER — PATIENT MESSAGE (OUTPATIENT)
Dept: FAMILY MEDICINE | Facility: CLINIC | Age: 45
End: 2025-08-06
Payer: COMMERCIAL